# Patient Record
Sex: FEMALE | Race: OTHER | Employment: UNEMPLOYED | URBAN - METROPOLITAN AREA
[De-identification: names, ages, dates, MRNs, and addresses within clinical notes are randomized per-mention and may not be internally consistent; named-entity substitution may affect disease eponyms.]

---

## 2017-06-08 ENCOUNTER — OFFICE VISIT (OUTPATIENT)
Dept: INTERNAL MEDICINE CLINIC | Age: 62
End: 2017-06-08

## 2017-06-08 ENCOUNTER — HOSPITAL ENCOUNTER (OUTPATIENT)
Dept: LAB | Age: 62
Discharge: HOME | End: 2017-06-08
Payer: MEDICARE

## 2017-06-08 VITALS
SYSTOLIC BLOOD PRESSURE: 136 MMHG | WEIGHT: 188 LBS | TEMPERATURE: 98 F | BODY MASS INDEX: 32.1 KG/M2 | OXYGEN SATURATION: 95 % | HEIGHT: 64 IN | HEART RATE: 86 BPM | RESPIRATION RATE: 17 BRPM | DIASTOLIC BLOOD PRESSURE: 85 MMHG

## 2017-06-08 DIAGNOSIS — Z86.39 HX OF THYROID NODULE: ICD-10-CM

## 2017-06-08 DIAGNOSIS — R30.0 DYSURIA: ICD-10-CM

## 2017-06-08 DIAGNOSIS — R30.0 DYSURIA: Primary | ICD-10-CM

## 2017-06-08 DIAGNOSIS — B37.31 VULVOVAGINAL CANDIDIASIS: ICD-10-CM

## 2017-06-08 DIAGNOSIS — Z87.448 HX OF KIDNEY DISEASE: ICD-10-CM

## 2017-06-08 PROBLEM — E07.9 THYROID LESION: Status: ACTIVE | Noted: 2017-06-08

## 2017-06-08 PROBLEM — N28.9 KIDNEY LESION: Status: ACTIVE | Noted: 2017-06-08

## 2017-06-08 PROBLEM — R73.03 PRE-DIABETES: Status: ACTIVE | Noted: 2017-06-08

## 2017-06-08 LAB
BILIRUB UR QL STRIP: NEGATIVE
GLUCOSE UR-MCNC: NEGATIVE MG/DL
KETONES P FAST UR STRIP-MCNC: NEGATIVE MG/DL
PH UR STRIP: 5.5 [PH] (ref 4.6–8)
PROT UR QL STRIP: NEGATIVE MG/DL
SP GR UR STRIP: 1.01 (ref 1–1.03)
UA UROBILINOGEN AMB POC: NORMAL (ref 0.2–1)
URINALYSIS CLARITY POC: CLEAR
URINALYSIS COLOR POC: YELLOW
URINE BLOOD POC: NEGATIVE
URINE LEUKOCYTES POC: NORMAL
URINE NITRITES POC: NEGATIVE

## 2017-06-08 PROCEDURE — 87086 URINE CULTURE/COLONY COUNT: CPT | Performed by: NURSE PRACTITIONER

## 2017-06-08 RX ORDER — FLUCONAZOLE 150 MG/1
150 TABLET ORAL ONCE
Qty: 1 TAB | Refills: 0 | Status: SHIPPED | OUTPATIENT
Start: 2017-06-08 | End: 2017-06-08

## 2017-06-08 RX ORDER — NICOTINE 21 MG/24H
PATCH, EXTENDED RELEASE TRANSDERMAL
COMMUNITY
Start: 2017-04-20 | End: 2018-07-06 | Stop reason: ALTCHOICE

## 2017-06-08 NOTE — PROGRESS NOTES
Chief Complaint   Patient presents with    Urinary Frequency       Pt preferred language for health care discussion is english. Is someone accompanying this pt? no    Is the patient using any DME equipment during OV? no    Depression Screening completed. Yes    Learning Assessment completed. YEs    Abuse Screening completed. Yes    Health Maintenance reviewed and discussed per provider. Yes, first visit all HM discussed with the Provider    Advance Directive:  1. Do you have an advance directive in place? Patient Reply:no    2. If not, would you like material regarding how to put one in place? Patient Reply: No    Coordination of Care:  1. Have you been to the ER, urgent care clinic since your last visit? Hospitalized since your last visit? no    2. Have you seen or consulted any other health care providers outside of the [address] [place] [address] since your last visit? Include any pap smears or colon screening.  no

## 2017-06-08 NOTE — PROGRESS NOTES
HISTORY OF PRESENT ILLNESS  Bertha Paula is a 57 y.o. female. HPI Comments: Pt presents today with c/o creamy-white discharge onset 2 weeks ago, with mild odor and dysuria, frequency and urgency. Pt has tried increased fluids and cranberry juice with mild relief. Pt also reports vulvovaginal itching, recent onset of 2 days ago. Pt denies fever/chills, dark cloudy urine, hematuria, low abdominal pain, flank pain, N/V, exposure to STI, hx of prior yeast infection, UTIs. Pt also requesting to change PCP due to location. Last visit 6 months ago. She reports a history of thyroid and kidney lesion for which she gets a yearly MRI. She is due for thyroid MRI now and kidney MRI in August.                     Review of Systems   Constitutional: Negative for chills and fever. Respiratory: Negative. Negative for shortness of breath. Cardiovascular: Negative. Negative for chest pain. Gastrointestinal: Negative. Negative for abdominal pain, nausea and vomiting. Genitourinary: Positive for dysuria (mild), frequency and urgency. Negative for flank pain and hematuria. Physical Exam   Constitutional: She is oriented to person, place, and time. Vital signs are normal. She appears well-developed and well-nourished. No distress. Cardiovascular: Normal rate, regular rhythm and normal heart sounds. Pulmonary/Chest: Effort normal and breath sounds normal.   Abdominal: Soft. Bowel sounds are normal.   Neurological: She is alert and oriented to person, place, and time.      Visit Vitals    /85    Pulse 86    Temp 98 °F (36.7 °C) (Oral)    Resp 17    Ht 5' 4\" (1.626 m)    Wt 188 lb (85.3 kg)    SpO2 95%    BMI 32.27 kg/m2      Recent Results (from the past 12 hour(s))   AMB POC URINALYSIS DIP STICK AUTO W/O MICRO    Collection Time: 06/08/17  2:42 PM   Result Value Ref Range    Color (UA POC) Yellow     Clarity (UA POC) Clear     Glucose (UA POC) Negative Negative    Bilirubin (UA POC) Negative Negative    Ketones (UA POC) Negative Negative    Specific gravity (UA POC) 1.010 1.001 - 1.035    Blood (UA POC) Negative Negative    pH (UA POC) 5.5 4.6 - 8.0    Protein (UA POC) Negative Negative mg/dL    Urobilinogen (UA POC) 0.2 mg/dL 0.2 - 1    Nitrites (UA POC) Negative Negative    Leukocyte esterase (UA POC) Trace Negative      ASSESSMENT and PLAN    ICD-10-CM ICD-9-CM    1. Dysuria R30.0 788.1 AMB POC URINALYSIS DIP STICK AUTO W/O MICRO      CULTURE, URINE   2. Vulvovaginal candidiasis B37.3 112.1 fluconazole (DIFLUCAN) 150 mg tablet   3. Hx of thyroid nodule Z86.39 V12.29 REFERRAL TO ENDOCRINOLOGY   4. Hx of kidney disease Z87.448 V13.09 REFERRAL TO NEPHROLOGY     Reviewed medications and plan with patient. Send out urine culture. Schedule for f/u physical to establish care in 1 month.

## 2017-06-08 NOTE — PATIENT INSTRUCTIONS

## 2017-06-08 NOTE — MR AVS SNAPSHOT
Visit Information Date & Time Provider Department Dept. Phone Encounter #  
 6/8/2017  2:30 PM Arturo Broderick NP [address] [phone] [id number] Follow-up Instructions Return in about 1 month (around 7/8/2017) for Routine Physical Exam, F/up. Upcoming Health Maintenance Date Due Hepatitis C Screening 1955 Pneumococcal 19-64 Medium Risk (1 of 1 - PPSV23) 12/28/1973 DTaP/Tdap/Td series (1 - Tdap) 12/28/1975 PAP AKA CERVICAL CYTOLOGY 12/28/1975 BREAST CANCER SCRN MAMMOGRAM 12/28/2004 FOBT Q 1 YEAR AGE 50-75 12/28/2004 ZOSTER VACCINE AGE 60> 12/28/2014 INFLUENZA AGE 9 TO ADULT 8/1/2017 Allergies as of 6/8/2017  Review Complete On: 6/8/2017 By: Arturo Broderick NP Severity Noted Reaction Type Reactions Ambien [Zolpidem]  09/13/2010    Anxiety Current Immunizations  Never Reviewed No immunizations on file. Not reviewed this visit You Were Diagnosed With   
  
 Codes Comments Dysuria    -  Primary ICD-10-CM: R30.0 ICD-9-CM: 239. 1 Vulvovaginal candidiasis     ICD-10-CM: B37.3 ICD-9-CM: 112.1 Hx of thyroid nodule     ICD-10-CM: Z86.39 
ICD-9-CM: V12.29 Hx of kidney disease     ICD-10-CM: Z87.448 ICD-9-CM: V13.09 Vitals BP Pulse Temp Resp Height(growth percentile) Weight(growth percentile) 136/85 86 98 °F (36.7 °C) (Oral) 17 5' 4\" (1.626 m) 188 lb (85.3 kg) SpO2 BMI OB Status Smoking Status 95% 32.27 kg/m2 Postmenopausal Current Every Day Smoker BMI and BSA Data Body Mass Index Body Surface Area  
 32.27 kg/m 2 1.96 m 2 Your Updated Medication List  
  
   
This list is accurate as of: 6/8/17  2:58 PM.  Always use your most recent med list.  
  
  
  
  
 fluconazole 150 mg tablet Commonly known as:  DIFLUCAN Take 1 Tab by mouth once for 1 dose. Indications: VULVOVAGINAL CANDIDIASIS  
  
 IRON (FERROUS SULFATE) PO Take  by mouth. MULTIVITAMIN PO Take  by mouth. NICODERM CQ 21 mg/24 hr  
Generic drug:  nicotine NICOrelief 4 mg gum Generic drug:  nicotine SYNTHROID PO Take  by mouth. Prescriptions Printed Refills  
 fluconazole (DIFLUCAN) 150 mg tablet 0 Sig: Take 1 Tab by mouth once for 1 dose. Indications: VULVOVAGINAL CANDIDIASIS Class: Print Route: Oral  
  
We Performed the Following AMB POC URINALYSIS DIP STICK AUTO W/O MICRO [71128 CPT(R)] REFERRAL TO ENDOCRINOLOGY [NXQ45 Custom] Comments:  
 Please evaluate patient for f/u thyroid lesion, yearly MRI is due. In [address] Custom] Comments:  
 Please evaluate patient for kidney lesion, yearly MRI due in August.  
  
Follow-up Instructions Return in about 1 month (around 7/8/2017) for Routine Physical Exam, F/up. Referral Information Referral ID Referred By Referred To  
  
 [id number] Mary Su Not Available Visits Status Start Date End Date 1 New Request 6/8/17 6/8/18 If your referral has a status of pending review or denied, additional information will be sent to support the outcome of this decision. Referral ID Referred By Referred To  
 [id number] Mary Su Not Available Visits Status Start Date End Date 1 New Request 6/8/17 6/8/18 If your referral has a status of pending review or denied, additional information will be sent to support the outcome of this decision. Patient Instructions Vaginal Yeast Infection: Care Instructions Your Care Instructions A vaginal yeast infection is caused by too many yeast cells in the vagina. This is common in women of all ages. Itching, vaginal discharge and irritation, and other symptoms can bother you. But yeast infections don't often cause other health problems. Some medicines can increase your risk of getting a yeast infection.  These include antibiotics, birth control pills, hormones, and steroids. You may also be more likely to get a yeast infection if you are pregnant, have diabetes, douche, or wear tight clothes. With treatment, most yeast infections get better in 2 to 3 days. Follow-up care is a key part of your treatment and safety. Be sure to make and go to all appointments, and call your doctor if you are having problems. It's also a good idea to know your test results and keep a list of the medicines you take. How can you care for yourself at home? · Take your medicines exactly as prescribed. Call your doctor if you think you are having a problem with your medicine. · Ask your doctor about over-the-counter (OTC) medicines for yeast infections. They may cost less than prescription medicines. If you use an OTC treatment, read and follow all instructions on the label. · Do not use tampons while using a vaginal cream or suppository. The tampons can absorb the medicine. Use pads instead. · Wear loose cotton clothing. Do not wear nylon or other fabric that holds body heat and moisture close to the skin. · Try sleeping without underwear. · Do not scratch. Relieve itching with a cold pack or a cool bath. · Do not wash your vaginal area more than once a day. Use plain water or a mild, unscented soap. Air-dry the vaginal area. · Change out of wet swimsuits after swimming. · Do not have sex until you have finished your treatment. · Do not douche. When should you call for help? Call your doctor now or seek immediate medical care if: 
· You have unexpected vaginal bleeding. · You have new or increased pain in your vagina or pelvis. Watch closely for changes in your health, and be sure to contact your doctor if: 
· You have a fever. · You are not getting better after 2 days. · Your symptoms come back after you finish your medicines. Where can you learn more? Go to http://frieda-prudence.info/. Enter V602 in the search box to learn more about \"Vaginal Yeast Infection: Care Instructions. \" Current as of: October 13, 2016 Content Version: 11.2 © 7942-1842 [employer]ov[employer]. Care instructions adapted under license by [employer] (which disclaims liability or warranty for this information). If you have questions about a medical condition or this instruction, always ask your healthcare professional. [address] any warranty or liability for your use of this information. Introducing [facility] & HEALTH SERVICES! Peter Schuster introduces Max-Viz patient portal. Now you can access parts of your medical record, email your doctor's office, and request medication refills online. 1. In your internet browser, go to [email or web address] 2. Click on the First Time User? Click Here link in the Sign In box. You will see the New Member Sign Up page. 3. Enter your Max-Viz Access Code exactly as it appears below. You will not need to use this code after youve completed the sign-up process. If you do not sign up before the expiration date, you must request a new code. · Max-Viz Access Code: [email or web address] Expires: 9/6/2017  2:58 PM 
 
4. Enter the last four digits of your Social Security Number (xxxx) and Date of Birth (mm/dd/yyyy) as indicated and click Submit. You will be taken to the next sign-up page. 5. Create a Max-Viz ID. This will be your Max-Viz login ID and cannot be changed, so think of one that is secure and easy to remember. 6. Create a Max-Viz password. You can change your password at any time. 7. Enter your Password Reset Question and Answer. This can be used at a later time if you forget your password. 8. Enter your e-mail address. You will receive e-mail notification when new information is available in [address] 9. Click Sign Up. You can now view and download portions of your medical record. 10. Click the Download Summary menu link to download a portable copy of your medical information. If you have questions, please visit the Frequently Asked Questions section of the CogniK website. Remember, CogniK is NOT to be used for urgent needs. For medical emergencies, dial 911. Now available from your iPhone and Android! Please provide this summary of care documentation to your next provider. Your primary care clinician is listed as Clifton Valladares. If you have any questions after today's visit, please call [phone].

## 2017-06-10 LAB
BACTERIA SPEC CULT: NORMAL
SERVICE CMNT-IMP: NORMAL

## 2018-04-26 ENCOUNTER — OFFICE VISIT (OUTPATIENT)
Dept: INTERNAL MEDICINE CLINIC | Age: 63
End: 2018-04-26

## 2018-04-26 VITALS
HEIGHT: 64 IN | RESPIRATION RATE: 14 BRPM | DIASTOLIC BLOOD PRESSURE: 86 MMHG | OXYGEN SATURATION: 100 % | HEART RATE: 78 BPM | SYSTOLIC BLOOD PRESSURE: 152 MMHG | BODY MASS INDEX: 33.29 KG/M2 | TEMPERATURE: 97.8 F | WEIGHT: 195 LBS

## 2018-04-26 DIAGNOSIS — M25.471 SWELLING OF BOTH ANKLES: Primary | ICD-10-CM

## 2018-04-26 DIAGNOSIS — M25.472 SWELLING OF BOTH ANKLES: Primary | ICD-10-CM

## 2018-04-26 NOTE — PROGRESS NOTES
HISTORY OF PRESENT ILLNESS  Jomar Najera is a 60 y.o. female. Patient presents with bilateral ankle swelling x 2 years, believes the her ankle are more swollen and associated with numbness and tingling. States she was told by [facility] doctor\" that it was lymphedema, patient is being followed at the [place] [address] for her TSH, kidney disease. 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 Patient denies any HA,blurry vision, unilateral weakness, slurred speech,facial drooling,drooping, NV,numbness,tingling,dizziness,palpitations, malaise,faigue,confusion,SOB,CP. Ankle swelling    The history is provided by the patient. This is a chronic problem. The pain is present in the left lower leg and right lower leg. The pain is at a severity of 0/10. Associated symptoms include numbness and tingling. Pertinent negatives include full range of motion, no itching and no back pain. She has tried nothing for the symptoms. Review of Systems   HENT: Negative. Cardiovascular: Positive for leg swelling. Musculoskeletal: Negative for back pain. Bilateral lateral malleolus swelling associated with tingling, good dorsalis pedi pulses, good cap refill. Skin: Negative. Negative for itching. Neurological: Positive for tingling and numbness. Psychiatric/Behavioral: Negative. Physical Exam   Constitutional: She is oriented to person, place, and time. She appears well-developed and well-nourished. /86 (BP 1 Location: Right arm, BP Patient Position: Sitting)  Pulse 78  Temp 97.8 °F (36.6 °C) (Oral)   Resp 14  Ht 5' 4\" (1.626 m)  Wt 195 lb (88.5 kg)  LMP Comment: Hysterectomy  SpO2 100%  BMI 33.47 kg/m2     HENT:   Head: Normocephalic and atraumatic. Eyes: Conjunctivae and EOM are normal. Pupils are equal, round, and reactive to light. Neck: Normal range of motion. Cardiovascular: Normal rate. Pulmonary/Chest: Effort normal and breath sounds normal.   Musculoskeletal: Normal range of motion. Right ankle: She exhibits swelling. Left ankle: She exhibits swelling. Neurological: She is alert and oriented to person, place, and time. Skin: Skin is warm and dry. Psychiatric: She has a normal mood and affect. Her behavior is normal. Judgment and thought content normal.   Vitals reviewed. ASSESSMENT and PLAN    ICD-10-CM ICD-9-CM    1. Swelling of both ankles M25.471 729.81 AMB SUPPLY ORDER    M25.472       Encounter Diagnoses   Name Primary?  Swelling of both ankles Yes     Orders Placed This Encounter    AMB SUPPLY ORDER     Orders Placed This Encounter    AMB SUPPLY ORDER     Provide compression stockings. Orders Placed This Encounter    AMB SUPPLY ORDER     Diagnoses and all orders for this visit:    1. Swelling of both ankles  -     AMB SUPPLY ORDER      Follow-up Disposition:  Return if symptoms worsen or fail to improve. current treatment plan is effective, no change in therapy  the following changes in treatment are made: Patient declines any lab work up, she is being followed at the [place] where she just had labs done, needs an excuse note from work today, decrease long standing hours, start leg elevation,decrease sodium intake, use compression stockings,f/u with PCP.

## 2018-04-26 NOTE — MR AVS SNAPSHOT
[address] [facility] 
 
 
 [address] [facility] [address] [address] 
[phone] Patient: Citlaly Rodriguez MRN: [id number] :1955 Visit Information Date & Time Provider Department Dept. Phone Encounter #  
 2018  8:00 AM William Giron NP [facility] [phone] [id number] Follow-up Instructions Return if symptoms worsen or fail to improve. Upcoming Health Maintenance Date Due Hepatitis C Screening 1955 Pneumococcal 19-64 Medium Risk (1 of 1 - PPSV23) 1973 DTaP/Tdap/Td series (1 - Tdap) 1975 PAP AKA CERVICAL CYTOLOGY 1975 BREAST CANCER SCRN MAMMOGRAM 2004 FOBT Q 1 YEAR AGE 50-75 2004 ZOSTER VACCINE AGE 60> 10/28/2014 Influenza Age 5 to Adult 2017 MEDICARE YEARLY EXAM 3/14/2018 Allergies as of 2018  Review Complete On: 2017 By: Henry Shelley NP Severity Noted Reaction Type Reactions Ambien [Zolpidem]  2010    Anxiety Current Immunizations  Never Reviewed No immunizations on file. Not reviewed this visit You Were Diagnosed With   
  
 Codes Comments Swelling of both ankles    -  Primary ICD-10-CM: M25.471, M25.472 ICD-9-CM: 729.81 Vitals BP Pulse Temp Resp Height(growth percentile) Weight(growth percentile) 152/86 (BP 1 Location: Right arm, BP Patient Position: Sitting) 78 97.8 °F (36.6 °C) (Oral) 14 5' 4\" (1.626 m) 195 lb (88.5 kg) SpO2 BMI OB Status Smoking Status 100% 33.47 kg/m2 Postmenopausal Former Smoker Vitals History BMI and BSA Data Body Mass Index Body Surface Area  
 33.47 kg/m 2 2 m 2 Your Updated Medication List  
  
   
This list is accurate as of 18  8:53 AM.  Always use your most recent med list.  
  
  
  
  
 IRON (FERROUS SULFATE) PO Take  by mouth. MULTIVITAMIN PO Take  by mouth.   
  
 NICODERM CQ 21 mg/24 hr  
 Generic drug:  nicotine NICOrelief 4 mg gum Generic drug:  nicotine SYNTHROID PO Take  by mouth. We Performed the Following AMB SUPPLY ORDER [[id number] Custom] Comments:  
 Provide compression stockings. Follow-up Instructions Return if symptoms worsen or fail to improve. Patient Instructions Leg and Ankle Edema: Care Instructions Your Care Instructions Swelling in the legs, ankles, and feet is called edema. It is common after you sit or stand for a while. Long plane flights or car rides often cause swelling in the legs and feet. You may also have swelling if you have to stand for long periods of time at your job. Problems with the veins in the legs (varicose veins) and changes in hormones can also cause swelling. Sometimes the swelling in the ankles and feet is caused by a more serious problem, such as heart failure, infection, blood clots, or liver or kidney disease. Follow-up care is a key part of your treatment and safety. Be sure to make and go to all appointments, and call your doctor if you are having problems. It's also a good idea to know your test results and keep a list of the medicines you take. How can you care for yourself at home? · If your doctor gave you medicine, take it as prescribed. Call your doctor if you think you are having a problem with your medicine. · Whenever you are resting, raise your legs up. Try to keep the swollen area higher than the level of your heart. · Take breaks from standing or sitting in one position. ¨ Walk around to increase the blood flow in your lower legs. ¨ Move your feet and ankles often while you stand, or tighten and relax your leg muscles. · Wear support stockings. Put them on in the morning, before swelling gets worse. · Eat a balanced diet. Lose weight if you need to. · Limit the amount of salt (sodium) in your diet. Salt holds fluid in the body and may increase swelling. When should you call for help? Call 911 anytime you think you may need emergency care. For example, call if: 
? · You have symptoms of a blood clot in your lung (called a pulmonary embolism). These may include: 
¨ Sudden chest pain. ¨ Trouble breathing. ¨ Coughing up blood. ?Call your doctor now or seek immediate medical care if: 
? · You have signs of a blood clot, such as: 
¨ Pain in your calf, back of the knee, thigh, or groin. ¨ Redness and swelling in your leg or groin. ? · You have symptoms of infection, such as: 
¨ Increased pain, swelling, warmth, or redness. ¨ Red streaks or pus. ¨ A fever. ? Watch closely for changes in your health, and be sure to contact your doctor if: 
? · Your swelling is getting worse. ? · You have new or worsening pain in your legs. ? · You do not get better as expected. Where can you learn more? Go to http://frieda-prudence.info/. Enter D101 in the search box to learn more about \"Leg and Ankle Edema: Care Instructions. \" Current as of: March 20, 2017 Content Version: 11.4 © 6814-1080 [employer]. Care instructions adapted under license by [employer] (which disclaims liability or warranty for this information). If you have questions about a medical condition or this instruction, always ask your healthcare professional. [place] [address] any warranty or liability for your use of this information. Introducing [facility] & HEALTH SERVICES! [employer] introduces [employer] patient portal. Now you can access parts of your medical record, email your doctor's office, and request medication refills online. 1. In your internet browser, go to [email or web address]://Pretty in my Pocket (PRIMP). Survature/Pretty in my Pocket (PRIMP) 2. Click on the First Time User? Click Here link in the Sign In box. You will see the New Member Sign Up page. 3. Enter your [employer] Access Code exactly as it appears below.  You will not need to use this code after youve completed the sign-up process. If you do not sign up before the expiration date, you must request a new code. · LeisureLink Access Code: [email or web address] Expires: 7/25/2018  8:51 AM 
 
4. Enter the last four digits of your Social Security Number (xxxx) and Date of Birth (mm/dd/yyyy) as indicated and click Submit. You will be taken to the next sign-up page. 5. Create a LeisureLink ID. This will be your LeisureLink login ID and cannot be changed, so think of one that is secure and easy to remember. 6. Create a LeisureLink password. You can change your password at any time. 7. Enter your Password Reset Question and Answer. This can be used at a later time if you forget your password. 8. Enter your e-mail address. You will receive e-mail notification when new information is available in [address] 9. Click Sign Up. You can now view and download portions of your medical record. 10. Click the Download Summary menu link to download a portable copy of your medical information. If you have questions, please visit the Frequently Asked Questions section of the LeisureLink website. Remember, LeisureLink is NOT to be used for urgent needs. For medical emergencies, dial 911. Now available from your iPhone and Android! Please provide this summary of care documentation to your next provider. Your primary care clinician is listed as Letty Fairchild. If you have any questions after today's visit, please call [phone].

## 2018-04-26 NOTE — LETTER
NOTIFICATION RETURN TO WORK / SCHOOL 
 
4/26/2018 8:51 AM 
 
Ms. Raymundo Moreno [address] [facility] [id number] To Whom It May Concern: 
 
Raymundo Moreno is currently under the care of Giancarlo Guevara. She will return to work/school on: 04/27/2018 If there are questions or concerns please have the patient contact our office. Sincerely, Brook Rowland, NP

## 2018-04-26 NOTE — PROGRESS NOTES
Here today for progressively worsening bilateral ankle swelling x 2 years, RT > LT. Denies any injury, chest pain, SOB. No pain in knees or hips just ankles. States numbness and tingling with weight bearing and movement. Her PCP and pharmacy is at [facility], [employer].

## 2018-04-26 NOTE — PROGRESS NOTES
Patient presents with bilateral ankle swelling x 2 years, believes the her ankle are more swollen and associated with numbness and tingling. States she was told by Azam garcia\" that it was lymphedema, patient is being followed at the [place] [address] for her TSH, kidney disease. 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 Patient denies any HA,blurry vision, unilateral weakness, slurred speech,facial drooling,drooping, NV,numbness,tingling,dizziness,palpitations, malaise,faigue,confusion,SOB,CP.

## 2018-04-26 NOTE — PATIENT INSTRUCTIONS
Leg and Ankle Edema: Care Instructions  Your Care Instructions  Swelling in the legs, ankles, and feet is called edema. It is common after you sit or stand for a while. Long plane flights or car rides often cause swelling in the legs and feet. You may also have swelling if you have to stand for long periods of time at your job. Problems with the veins in the legs (varicose veins) and changes in hormones can also cause swelling. Sometimes the swelling in the ankles and feet is caused by a more serious problem, such as heart failure, infection, blood clots, or liver or kidney disease. Follow-up care is a key part of your treatment and safety. Be sure to make and go to all appointments, and call your doctor if you are having problems. It's also a good idea to know your test results and keep a list of the medicines you take. How can you care for yourself at home? · If your doctor gave you medicine, take it as prescribed. Call your doctor if you think you are having a problem with your medicine. · Whenever you are resting, raise your legs up. Try to keep the swollen area higher than the level of your heart. · Take breaks from standing or sitting in one position. ¨ Walk around to increase the blood flow in your lower legs. ¨ Move your feet and ankles often while you stand, or tighten and relax your leg muscles. · Wear support stockings. Put them on in the morning, before swelling gets worse. · Eat a balanced diet. Lose weight if you need to. · Limit the amount of salt (sodium) in your diet. Salt holds fluid in the body and may increase swelling. When should you call for help? Call 911 anytime you think you may need emergency care. For example, call if:  ? · You have symptoms of a blood clot in your lung (called a pulmonary embolism). These may include:  ¨ Sudden chest pain. ¨ Trouble breathing. ¨ Coughing up blood.    ?Call your doctor now or seek immediate medical care if:  ? · You have signs of a blood clot, such as:  ¨ Pain in your calf, back of the knee, thigh, or groin. ¨ Redness and swelling in your leg or groin. ? · You have symptoms of infection, such as:  ¨ Increased pain, swelling, warmth, or redness. ¨ Red streaks or pus. ¨ A fever. ? Watch closely for changes in your health, and be sure to contact your doctor if:  ? · Your swelling is getting worse. ? · You have new or worsening pain in your legs. ? · You do not get better as expected. Where can you learn more? Go to http://frieda-prudence.info/. Enter I072 in the search box to learn more about \"Leg and Ankle Edema: Care Instructions. \"  Current as of: March 20, 2017  Content Version: 11.4  © 5993-3287 [employer]. Care instructions adapted under license by [employer] (which disclaims liability or warranty for this information). If you have questions about a medical condition or this instruction, always ask your healthcare professional. [place] [address] any warranty or liability for your use of this information.

## 2018-07-06 ENCOUNTER — HOSPITAL ENCOUNTER (OUTPATIENT)
Dept: LAB | Age: 63
Discharge: HOME | End: 2018-07-06
Payer: MEDICARE

## 2018-07-06 ENCOUNTER — OFFICE VISIT (OUTPATIENT)
Dept: INTERNAL MEDICINE CLINIC | Age: 63
End: 2018-07-06

## 2018-07-06 VITALS
SYSTOLIC BLOOD PRESSURE: 137 MMHG | DIASTOLIC BLOOD PRESSURE: 84 MMHG | RESPIRATION RATE: 16 BRPM | TEMPERATURE: 97.5 F | BODY MASS INDEX: 32.1 KG/M2 | HEIGHT: 64 IN | HEART RATE: 78 BPM | OXYGEN SATURATION: 95 % | WEIGHT: 188 LBS

## 2018-07-06 DIAGNOSIS — N28.9 KIDNEY LESION: ICD-10-CM

## 2018-07-06 DIAGNOSIS — F17.210 CIGARETTE NICOTINE DEPENDENCE WITHOUT COMPLICATION: ICD-10-CM

## 2018-07-06 DIAGNOSIS — Z00.00 ANNUAL PHYSICAL EXAM: Primary | ICD-10-CM

## 2018-07-06 DIAGNOSIS — Z86.39 HISTORY OF THYROID NODULE: ICD-10-CM

## 2018-07-06 DIAGNOSIS — Z00.00 ANNUAL PHYSICAL EXAM: ICD-10-CM

## 2018-07-06 DIAGNOSIS — H53.9 VISION CHANGES: ICD-10-CM

## 2018-07-06 LAB
BASOPHILS # BLD: 0.1 K/UL (ref 0–0.06)
BASOPHILS NFR BLD: 1 % (ref 0–2)
DIFFERENTIAL METHOD BLD: ABNORMAL
EOSINOPHIL # BLD: 0.1 K/UL (ref 0–0.4)
EOSINOPHIL NFR BLD: 2 % (ref 0–5)
ERYTHROCYTE [DISTWIDTH] IN BLOOD BY AUTOMATED COUNT: 14.1 % (ref 11.6–14.5)
EST. AVERAGE GLUCOSE BLD GHB EST-MCNC: 111 MG/DL
HBA1C MFR BLD: 5.5 % (ref 4.2–5.6)
HCT VFR BLD AUTO: 43 % (ref 35–45)
HGB BLD-MCNC: 13.7 G/DL (ref 12–16)
LYMPHOCYTES # BLD: 1.8 K/UL (ref 0.9–3.6)
LYMPHOCYTES NFR BLD: 37 % (ref 21–52)
MCH RBC QN AUTO: 29.5 PG (ref 24–34)
MCHC RBC AUTO-ENTMCNC: 31.9 G/DL (ref 31–37)
MCV RBC AUTO: 92.7 FL (ref 74–97)
MONOCYTES # BLD: 0.4 K/UL (ref 0.05–1.2)
MONOCYTES NFR BLD: 8 % (ref 3–10)
NEUTS SEG # BLD: 2.6 K/UL (ref 1.8–8)
NEUTS SEG NFR BLD: 52 % (ref 40–73)
PLATELET # BLD AUTO: 259 K/UL (ref 135–420)
PMV BLD AUTO: 10.3 FL (ref 9.2–11.8)
RBC # BLD AUTO: 4.64 M/UL (ref 4.2–5.3)
WBC # BLD AUTO: 4.9 K/UL (ref 4.6–13.2)

## 2018-07-06 PROCEDURE — 85025 COMPLETE CBC W/AUTO DIFF WBC: CPT | Performed by: NURSE PRACTITIONER

## 2018-07-06 PROCEDURE — 80061 LIPID PANEL: CPT | Performed by: NURSE PRACTITIONER

## 2018-07-06 PROCEDURE — 36415 COLL VENOUS BLD VENIPUNCTURE: CPT | Performed by: NURSE PRACTITIONER

## 2018-07-06 PROCEDURE — 83036 HEMOGLOBIN GLYCOSYLATED A1C: CPT | Performed by: NURSE PRACTITIONER

## 2018-07-06 PROCEDURE — 84443 ASSAY THYROID STIM HORMONE: CPT | Performed by: NURSE PRACTITIONER

## 2018-07-06 PROCEDURE — 80053 COMPREHEN METABOLIC PANEL: CPT | Performed by: NURSE PRACTITIONER

## 2018-07-06 PROCEDURE — 84439 ASSAY OF FREE THYROXINE: CPT | Performed by: NURSE PRACTITIONER

## 2018-07-06 PROCEDURE — 84480 ASSAY TRIIODOTHYRONINE (T3): CPT | Performed by: NURSE PRACTITIONER

## 2018-07-06 RX ORDER — LEVOTHYROXINE SODIUM 25 UG/1
25 TABLET ORAL
Qty: 90 TAB | Refills: 0 | Status: SHIPPED | OUTPATIENT
Start: 2018-07-06 | End: 2018-10-03 | Stop reason: SDUPTHER

## 2018-07-06 RX ORDER — ACETAMINOPHEN 500 MG
2 TABLET ORAL AS NEEDED
Qty: 190 EACH | Refills: 0 | Status: SHIPPED | OUTPATIENT
Start: 2018-07-06 | End: 2018-08-05

## 2018-07-06 NOTE — PROGRESS NOTES
HISTORY OF PRESENT ILLNESS  Debbie Love is a 60 y.o. female. HPI Comments: Patient was here to establish care. She has a history of thyroid lesions, kidney lesions. Establish Care   The history is provided by the patient. Pertinent negatives include no chest pain, no abdominal pain, no headaches and no shortness of breath. Thyroid Problem   Pertinent negatives include no chest pain, no abdominal pain, no headaches and no shortness of breath. Review of Systems   Constitutional: Negative for chills, diaphoresis, fever, malaise/fatigue and weight loss. HENT: Negative for congestion, ear discharge, ear pain, hearing loss, nosebleeds, sore throat and tinnitus. Eyes: Positive for blurred vision. Negative for double vision, photophobia, pain, discharge and redness. Respiratory: Negative for cough, hemoptysis, sputum production, shortness of breath, wheezing and stridor. Cardiovascular: Negative for chest pain, palpitations, orthopnea, claudication, leg swelling and PND. Gastrointestinal: Negative for abdominal pain, blood in stool, constipation, diarrhea, heartburn, melena, nausea and vomiting. Genitourinary: Negative for dysuria, flank pain, frequency, hematuria and urgency. Musculoskeletal: Negative for back pain, falls, joint pain, myalgias and neck pain. Skin: Negative for itching and rash. Neurological: Negative for dizziness, tingling, tremors, sensory change, speech change, focal weakness, seizures, loss of consciousness, weakness and headaches. Endo/Heme/Allergies: Negative for environmental allergies. Does not bruise/bleed easily. Psychiatric/Behavioral: Negative for depression, hallucinations, memory loss, substance abuse and suicidal ideas. The patient is not nervous/anxious and does not have insomnia. Physical Exam   Constitutional: She is oriented to person, place, and time. She appears well-developed and well-nourished. No distress.    HENT:   Head: Normocephalic and atraumatic. Right Ear: External ear normal.   Left Ear: External ear normal.   Nose: Nose normal.   Mouth/Throat: Oropharynx is clear and moist. No oropharyngeal exudate. Eyes: Conjunctivae and EOM are normal. Pupils are equal, round, and reactive to light. Right eye exhibits no discharge. Left eye exhibits no discharge. Neck: Normal range of motion. Neck supple. No tracheal deviation present. No thyromegaly present. Cardiovascular: Normal rate, regular rhythm and normal heart sounds. Pulmonary/Chest: Effort normal and breath sounds normal. No respiratory distress. She has no wheezes. Abdominal: Soft. Bowel sounds are normal. She exhibits no distension. There is no tenderness. Musculoskeletal: Normal range of motion. She exhibits no edema. Lymphadenopathy:     She has no cervical adenopathy. Neurological: She is alert and oriented to person, place, and time. No cranial nerve deficit. Skin: Skin is warm and dry. She is not diaphoretic. No erythema. Psychiatric: She has a normal mood and affect. ASSESSMENT and PLAN    ICD-10-CM ICD-9-CM    1. Annual physical exam Z00.00 V70.0 CBC WITH AUTOMATED DIFF      TSH 3RD GENERATION      T3 TOTAL      T4, FREE      LIPID PANEL      HEMOGLOBIN A1C WITH EAG      METABOLIC PANEL, COMPREHENSIVE      REFERRAL TO GASTROENTEROLOGY   2. History of thyroid nodule Z86.39 V12.29 levothyroxine (SYNTHROID) 25 mcg tablet   3. Kidney lesion N28.9 593.9 REFERRAL TO NEPHROLOGY      REFERRAL TO ENDOCRINOLOGY   4. Vision changes H53.9 368.9 REFERRAL TO OPHTHALMOLOGY   5. Cigarette nicotine dependence without complication Z24.509 451.6 nicotine (NICORETTE) 2 mg gum   Head to toe assessment performed, all basic labs will be obtained. Patient teaching done regarding healthy eating and exercise. Patient advised to keep in contact with PCP for regular checkups. Further plan of care will be established according to lab results.

## 2018-07-06 NOTE — PATIENT INSTRUCTIONS
Well Visit, Women 48 to 72: Care Instructions  Your Care Instructions    Physical exams can help you stay healthy. Your doctor has checked your overall health and may have suggested ways to take good care of yourself. He or she also may have recommended tests. At home, you can help prevent illness with healthy eating, regular exercise, and other steps. Follow-up care is a key part of your treatment and safety. Be sure to make and go to all appointments, and call your doctor if you are having problems. It's also a good idea to know your test results and keep a list of the medicines you take. How can you care for yourself at home? · Reach and stay at a healthy weight. This will lower your risk for many problems, such as obesity, diabetes, heart disease, and high blood pressure. · Get at least 30 minutes of exercise on most days of the week. Walking is a good choice. You also may want to do other activities, such as running, swimming, cycling, or playing tennis or team sports. · Do not smoke. Smoking can make health problems worse. If you need help quitting, talk to your doctor about stop-smoking programs and medicines. These can increase your chances of quitting for good. · Protect your skin from too much sun. When you're outdoors from 10 a.m. to 4 p.m., stay in the shade or cover up with clothing and a hat with a wide brim. Wear sunglasses that block UV rays. Even when it's cloudy, put broad-spectrum sunscreen (SPF 30 or higher) on any exposed skin. · See a dentist one or two times a year for checkups and to have your teeth cleaned. · Wear a seat belt in the car. · Limit alcohol to 1 drink a day. Too much alcohol can cause health problems. Follow your doctor's advice about when to have certain tests. These tests can spot problems early. · Cholesterol.  Your doctor will tell you how often to have this done based on your age, family history, or other things that can increase your risk for heart attack and stroke. · Blood pressure. Have your blood pressure checked during a routine doctor visit. Your doctor will tell you how often to check your blood pressure based on your age, your blood pressure results, and other factors. · Mammogram. Ask your doctor how often you should have a mammogram, which is an X-ray of your breasts. A mammogram can spot breast cancer before it can be felt and when it is easiest to treat. · Pap test and pelvic exam. Ask your doctor how often you should have a Pap test. You may not need to have a Pap test as often as you used to. · Vision. Have your eyes checked every year or two or as often as your doctor suggests. Some experts recommend that you have yearly exams for glaucoma and other age-related eye problems starting at age 48. · Hearing. Tell your doctor if you notice any change in your hearing. You can have tests to find out how well you hear. · Diabetes. Ask your doctor whether you should have tests for diabetes. · Colon cancer. You should begin tests for colon cancer at age 48. You may have one of several tests. Your doctor will tell you how often to have tests based on your age and risk. Risks include whether you already had a precancerous polyp removed from your colon or whether your parents, sisters and brothers, or children have had colon cancer. · Thyroid disease. Talk to your doctor about whether to have your thyroid checked as part of a regular physical exam. Women have an increased chance of a thyroid problem. · Osteoporosis. You should begin tests for bone density at age 72. If you are younger than 72, ask your doctor whether you have factors that may increase your risk for this disease. You may want to have this test before age 72. · Heart attack and stroke risk. At least every 4 to 6 years, you should have your risk for heart attack and stroke assessed.  Your doctor uses factors such as your age, blood pressure, cholesterol, and whether you smoke or have diabetes to show what your risk for a heart attack or stroke is over the next 10 years. When should you call for help? Watch closely for changes in your health, and be sure to contact your doctor if you have any problems or symptoms that concern you. Where can you learn more? Go to http://frieda-prudence.info/. Enter X228 in the search box to learn more about \"Well Visit, Women 50 to 72: Care Instructions. \"  Current as of: May 12, 2017  Content Version: 11.4  © 0954-2564 [employer]. Care instructions adapted under license by [employer] (which disclaims liability or warranty for this information). If you have questions about a medical condition or this instruction, always ask your healthcare professional. [address] any warranty or liability for your use of this information.

## 2018-07-06 NOTE — MR AVS SNAPSHOT
Jennifer Godoy 
 
 
 [address] [facility] [address] 
[phone] Patient: Erendira Tavares MRN: [id number] :1955 Visit Information Date & Time Provider Department Dept. Phone Encounter #  
 2018 10:00 AM Emiliano Londono NP [employer] [phone] [id number] Upcoming Health Maintenance Date Due Hepatitis C Screening 1955 DTaP/Tdap/Td series (1 - Tdap) 1975 PAP AKA CERVICAL CYTOLOGY 1975 BREAST CANCER SCRN MAMMOGRAM 2004 FOBT Q 1 YEAR AGE 50-75 2004 ZOSTER VACCINE AGE 60> 10/28/2014 MEDICARE YEARLY EXAM 3/14/2018 Influenza Age 5 to Adult 2018 Allergies as of 2018  Review Complete On: 2018 By: Emiliano Londono NP Severity Noted Reaction Type Reactions Ambien [Zolpidem]  2010    Anxiety Current Immunizations  Never Reviewed No immunizations on file. Not reviewed this visit You Were Diagnosed With   
  
 Codes Comments Annual physical exam    -  Primary ICD-10-CM: Z00.00 ICD-9-CM: V70.0 History of thyroid nodule     ICD-10-CM: Z86.39 
ICD-9-CM: V12.29 Kidney lesion     ICD-10-CM: N28.9 ICD-9-CM: 593.9 Vision changes     ICD-10-CM: H53.9 ICD-9-CM: 368.9 Vitals BP Pulse Temp Resp Height(growth percentile) Weight(growth percentile) 137/84 (BP 1 Location: Right arm, BP Patient Position: Sitting) 78 97.5 °F (36.4 °C) (Oral) 16 5' 4\" (1.626 m) 188 lb (85.3 kg) SpO2 BMI OB Status Smoking Status 95% 32.27 kg/m2 Postmenopausal Former Smoker Vitals History BMI and BSA Data Body Mass Index Body Surface Area  
 32.27 kg/m 2 1.96 m 2 Preferred Pharmacy Pharmacy Name Phone [facility] [address], [address] Your Updated Medication List  
  
   
This list is accurate as of 18 10:28 AM.  Always use your most recent med list.  
  
  
  
  
 IRON (FERROUS SULFATE) PO Take  by mouth.  
  
 levothyroxine 25 mcg tablet Commonly known as:  synthroid Take 1 Tab by mouth once over twenty-four (24) hours for 90 days. MULTIVITAMIN PO Take  by mouth. NICODERM CQ 21 mg/24 hr  
Generic drug:  nicotine NICOrelief 4 mg gum Generic drug:  nicotine Prescriptions Sent to Pharmacy Refills  
 levothyroxine (SYNTHROID) 25 mcg tablet 0 Sig: Take 1 Tab by mouth once over twenty-four (24) hours for 90 days. Class: Normal  
 Pharmacy: [address], [address] #: [phone] Route: Oral  
  
We Performed the Following REFERRAL TO ENDOCRINOLOGY [TUX38 Custom] REFERRAL TO GASTROENTEROLOGY [ENN50 Custom] Comments:  
 Please evaluate patient for need for colonoscopy. REFERRAL TO NEPHROLOGY [FVR01 Custom] REFERRAL TO OPHTHALMOLOGY [REF57 Custom] Comments:  
 Please evaluate patient for vision changes. To-Do List   
 07/06/2018 Lab:  CBC WITH AUTOMATED DIFF   
  
 07/06/2018 Lab:  HEMOGLOBIN A1C WITH EAG   
  
 07/06/2018 Lab:  LIPID PANEL   
  
 07/06/2018 Lab:  METABOLIC PANEL, COMPREHENSIVE   
  
 07/06/2018 Lab:  T3 TOTAL   
  
 07/06/2018 Lab:  T4, FREE   
  
 07/06/2018 Lab:  TSH 3RD GENERATION Referral Information Referral ID Referred By Referred To  
  
 [id number] Karyle Spies Not Available Visits Status Start Date End Date 1 New Request 7/6/18 7/6/19 If your referral has a status of pending review or denied, additional information will be sent to support the outcome of this decision. Referral ID Referred By Referred To  
 [id number] Karyle Spies Not Available Visits Status Start Date End Date 1 New Request 7/6/18 7/6/19 If your referral has a status of pending review or denied, additional information will be sent to support the outcome of this decision. Referral ID Referred By Referred To  
 [id number] Taylor Fields Not Available Visits Status Start Date End Date 1 New Request 7/6/18 7/6/19 If your referral has a status of pending review or denied, additional information will be sent to support the outcome of this decision. Referral ID Referred By Referred To  
 [id number] Taylor Fields Not Available Visits Status Start Date End Date 1 New Request 7/6/18 7/6/19 If your referral has a status of pending review or denied, additional information will be sent to support the outcome of this decision. Patient Instructions Well Visit, Women 48 to 72: Care Instructions Your Care Instructions Physical exams can help you stay healthy. Your doctor has checked your overall health and may have suggested ways to take good care of yourself. He or she also may have recommended tests. At home, you can help prevent illness with healthy eating, regular exercise, and other steps. Follow-up care is a key part of your treatment and safety. Be sure to make and go to all appointments, and call your doctor if you are having problems. It's also a good idea to know your test results and keep a list of the medicines you take. How can you care for yourself at home? · Reach and stay at a healthy weight. This will lower your risk for many problems, such as obesity, diabetes, heart disease, and high blood pressure. · Get at least 30 minutes of exercise on most days of the week. Walking is a good choice. You also may want to do other activities, such as running, swimming, cycling, or playing tennis or team sports. · Do not smoke. Smoking can make health problems worse. If you need help quitting, talk to your doctor about stop-smoking programs and medicines. These can increase your chances of quitting for good. · Protect your skin from too much sun.  When you're outdoors from 10 a.m. to 4 p.m., stay in the shade or cover up with clothing and a hat with a wide brim. Wear sunglasses that block UV rays. Even when it's cloudy, put broad-spectrum sunscreen (SPF 30 or higher) on any exposed skin. · See a dentist one or two times a year for checkups and to have your teeth cleaned. · Wear a seat belt in the car. · Limit alcohol to 1 drink a day. Too much alcohol can cause health problems. Follow your doctor's advice about when to have certain tests. These tests can spot problems early. · Cholesterol. Your doctor will tell you how often to have this done based on your age, family history, or other things that can increase your risk for heart attack and stroke. · Blood pressure. Have your blood pressure checked during a routine doctor visit. Your doctor will tell you how often to check your blood pressure based on your age, your blood pressure results, and other factors. · Mammogram. Ask your doctor how often you should have a mammogram, which is an X-ray of your breasts. A mammogram can spot breast cancer before it can be felt and when it is easiest to treat. · Pap test and pelvic exam. Ask your doctor how often you should have a Pap test. You may not need to have a Pap test as often as you used to. · Vision. Have your eyes checked every year or two or as often as your doctor suggests. Some experts recommend that you have yearly exams for glaucoma and other age-related eye problems starting at age 48. · Hearing. Tell your doctor if you notice any change in your hearing. You can have tests to find out how well you hear. · Diabetes. Ask your doctor whether you should have tests for diabetes. · Colon cancer. You should begin tests for colon cancer at age 48. You may have one of several tests. Your doctor will tell you how often to have tests based on your age and risk. Risks include whether you already had a precancerous polyp removed from your colon or whether your parents, sisters and brothers, or children have had colon cancer. · Thyroid disease. Talk to your doctor about whether to have your thyroid checked as part of a regular physical exam. Women have an increased chance of a thyroid problem. · Osteoporosis. You should begin tests for bone density at age 72. If you are younger than 72, ask your doctor whether you have factors that may increase your risk for this disease. You may want to have this test before age 72. · Heart attack and stroke risk. At least every 4 to 6 years, you should have your risk for heart attack and stroke assessed. Your doctor uses factors such as your age, blood pressure, cholesterol, and whether you smoke or have diabetes to show what your risk for a heart attack or stroke is over the next 10 years. When should you call for help? Watch closely for changes in your health, and be sure to contact your doctor if you have any problems or symptoms that concern you. Where can you learn more? Go to http://frieda-prudence.info/. Enter A267 in the search box to learn more about \"Well Visit, Women 50 to 72: Care Instructions. \" Current as of: May 12, 2017 Content Version: 11.4 © 5248-0162 [employer]. Care instructions adapted under license by [employer] (which disclaims liability or warranty for this information). If you have questions about a medical condition or this instruction, always ask your healthcare professional. [address] any warranty or liability for your use of this information. Introducing [facility]! Christie Castaneda introduces Neosens patient portal. Now you can access parts of your medical record, email your doctor's office, and request medication refills online. 1. In your internet browser, go to [email or web address] 2. Click on the First Time User? Click Here link in the Sign In box. You will see the New Member Sign Up page. 3. Enter your Neosens Access Code exactly as it appears below.  You will not need to use this code after youve completed the sign-up process. If you do not sign up before the expiration date, you must request a new code. · SpectraRep Access Code: [email or web address] Expires: 7/25/2018  8:51 AM 
 
4. Enter the last four digits of your Social Security Number (xxxx) and Date of Birth (mm/dd/yyyy) as indicated and click Submit. You will be taken to the next sign-up page. 5. Create a SpectraRep ID. This will be your SpectraRep login ID and cannot be changed, so think of one that is secure and easy to remember. 6. Create a SpectraRep password. You can change your password at any time. 7. Enter your Password Reset Question and Answer. This can be used at a later time if you forget your password. 8. Enter your e-mail address. You will receive e-mail notification when new information is available in [address] 9. Click Sign Up. You can now view and download portions of your medical record. 10. Click the Download Summary menu link to download a portable copy of your medical information. If you have questions, please visit the Frequently Asked Questions section of the SpectraRep website. Remember, SpectraRep is NOT to be used for urgent needs. For medical emergencies, dial 911. Now available from your iPhone and Android! Please provide this summary of care documentation to your next provider. Your primary care clinician is listed as Carlos Alonzo. If you have any questions after today's visit, please call [phone].

## 2018-07-06 NOTE — LETTER
7/9/2018 9:41 AM 
 
Ms. Herbert Hairston [address] [place] [address] [phone] Dear Herbert Hairston: Please find your most recent results below. Resulted Orders CBC WITH AUTOMATED DIFF Result Value Ref Range WBC 4.9 4.6 - 13.2 K/uL  
 RBC 4.64 4.20 - 5.30 M/uL  
 HGB 13.7 12.0 - 16.0 g/dL HCT 43.0 35.0 - 45.0 % MCV 92.7 74.0 - 97.0 FL  
 MCH 29.5 24.0 - 34.0 PG  
 MCHC 31.9 31.0 - 37.0 g/dL  
 RDW 14.1 11.6 - 14.5 % PLATELET 375 178 - 482 K/uL MPV 10.3 9.2 - 11.8 FL  
 NEUTROPHILS 52 40 - 73 % LYMPHOCYTES 37 21 - 52 % MONOCYTES 8 3 - 10 % EOSINOPHILS 2 0 - 5 % BASOPHILS 1 0 - 2 %  
 ABS. NEUTROPHILS 2.6 1.8 - 8.0 K/UL  
 ABS. LYMPHOCYTES 1.8 0.9 - 3.6 K/UL  
 ABS. MONOCYTES 0.4 0.05 - 1.2 K/UL  
 ABS. EOSINOPHILS 0.1 0.0 - 0.4 K/UL  
 ABS. BASOPHILS 0.1 (H) 0.0 - 0.06 K/UL  
 DF AUTOMATED    
TSH 3RD GENERATION Result Value Ref Range TSH 1.90 0.36 - 3.74 uIU/mL  
T3 TOTAL Result Value Ref Range T3, total 109 71 - 180 ng/dL Comment:  
   (NOTE) Performed At: [address] [id number] Rima Wilburn MD ZH:[id number] T4, FREE Result Value Ref Range T4, Free 0.8 0.7 - 1.5 NG/DL  
LIPID PANEL Result Value Ref Range LIPID PROFILE Cholesterol, total 186 <200 MG/DL Triglyceride 101 <150 MG/DL Comment:  
   The drugs N-acetylcysteine (NAC) and 
Metamiszole have been found to cause falsely 
low results in this chemical assay. Please 
be sure to submit blood samples obtained BEFORE administration of either of these 
drugs to assure correct results. HDL Cholesterol 66 (H) 40 - 60 MG/DL  
 LDL, calculated 99.8 0 - 100 MG/DL VLDL, calculated 20.2 MG/DL  
 CHOL/HDL Ratio 2.8 0 - 5.0 HEMOGLOBIN A1C WITH EAG Result Value Ref Range Hemoglobin A1c 5.5 4.2 - 5.6 % Comment:  
   (NOTE) HbA1C Interpretive Ranges <5.7              Normal 
 5.7 - 6.4         Consider Prediabetes >6.5              Consider Diabetes Est. average glucose 111 mg/dL Comment:  
   (NOTE) The eAG should be interpreted with patient characteristics in mind  
since ethnicity, interindividual differences, red cell lifespan,  
variation in rates of glycation, etc. may affect the validity of the  
calculation. METABOLIC PANEL, COMPREHENSIVE Result Value Ref Range Sodium 143 136 - 145 mmol/L Potassium 4.2 3.5 - 5.5 mmol/L Chloride 108 100 - 108 mmol/L  
 CO2 26 21 - 32 mmol/L Anion gap 9 3.0 - 18 mmol/L Glucose 86 74 - 99 mg/dL BUN 16 7.0 - 18 MG/DL Creatinine 0.68 0.6 - 1.3 MG/DL  
 BUN/Creatinine ratio 24 (H) 12 - 20 GFR est AA >60 >60 ml/min/1.73m2 GFR est non-AA >60 >60 ml/min/1.73m2 Comment:  
   (NOTE) Estimated GFR is calculated using the Modification of Diet in Renal  
Disease (MDRD) Study equation, reported for both  Americans [facility]) and non- Americans (GFRNA), and normalized to 1.73m2  
body surface area. The physician must decide which value applies to  
the patient. The MDRD study equation should only be used in  
individuals age 25 or older. It has not been validated for the  
following: pregnant women, patients with serious comorbid conditions,  
or on certain medications, or persons with extremes of body size,  
muscle mass, or nutritional status. Calcium 8.9 8.5 - 10.1 MG/DL Bilirubin, total 0.6 0.2 - 1.0 MG/DL  
 ALT (SGPT) 23 13 - 56 U/L  
 AST (SGOT) 17 15 - 37 U/L Alk. phosphatase 106 45 - 117 U/L Protein, total 6.8 6.4 - 8.2 g/dL Albumin 4.0 3.4 - 5.0 g/dL Globulin 2.8 2.0 - 4.0 g/dL A-G Ratio 1.4 0.8 - 1.7 RECOMMENDATIONS: 
\"None. Keep up the good work! Please call me if you have any questions: [phone] Sincerely, Jerel Phillips LPN

## 2018-07-06 NOTE — PROGRESS NOTES
Identified pt with two pt identifiers(name and ). Reviewed record in preparation for visit and have obtained necessary documentation. Charles Merino presents today for   Chief Complaint   Patient presents with   [address] Thyroid Problem       John Jose Love preferred language for health care discussion is english/other. Is someone accompanying this pt? No    Is the patient using any DME equipment during OV? No    Depression Screening:  PHQ over the last two weeks 2018 2018 2017 2017 5/10/2016   Little interest or pleasure in doing things Not at all Not at all Not at all Not at all Not at all   Feeling down, depressed or hopeless Not at all Not at all Not at all Not at all Not at all   Total Score PHQ 2 0 0 0 0 0       Learning Assessment:  Learning Assessment 2018   PRIMARY LEARNER Patient   HIGHEST LEVEL OF EDUCATION - PRIMARY LEARNER  2 YEARS [address] CAREGIVER No   PRIMARY LANGUAGE ENGLISH    NEED No   LEARNER PREFERENCE PRIMARY DEMONSTRATION   LEARNING SPECIAL TOPICS no   ANSWERED BY patient   RELATIONSHIP SELF   ASSESSMENT COMMENT none       Abuse Screening:    Abuse Screening Questionnaire 2018   Do you ever feel afraid of your partner? N   Are you in a relationship with someone who physically or mentally threatens you? N   Is it safe for you to go home? Y       Fall Risk  Fall Risk Assessment, last 12 mths 2018   Able to walk? Yes   Fall in past 12 months? No     Health Maintenance reviewed and discussed per provider. Yes  Please order/place referral if appropriate. Advance Directive:  1. Do you have an advance directive in place? Patient Reply: No    2. If not, would you like material regarding how to put one in place? Patient Reply: No    Coordination of Care:  1. Have you been to the ER, urgent care clinic since your last visit? Hospitalized since your last visit? No    2.  Have you seen or consulted any other health care providers outside of the [address] [place] [address] since your last visit? Include any pap smears or colon screening.  No

## 2018-07-07 LAB
ALBUMIN SERPL-MCNC: 4 G/DL (ref 3.4–5)
ALBUMIN/GLOB SERPL: 1.4 {RATIO} (ref 0.8–1.7)
ALP SERPL-CCNC: 106 U/L (ref 45–117)
ALT SERPL-CCNC: 23 U/L (ref 13–56)
ANION GAP SERPL CALC-SCNC: 9 MMOL/L (ref 3–18)
AST SERPL-CCNC: 17 U/L (ref 15–37)
BILIRUB SERPL-MCNC: 0.6 MG/DL (ref 0.2–1)
BUN SERPL-MCNC: 16 MG/DL (ref 7–18)
BUN/CREAT SERPL: 24 (ref 12–20)
CALCIUM SERPL-MCNC: 8.9 MG/DL (ref 8.5–10.1)
CHLORIDE SERPL-SCNC: 108 MMOL/L (ref 100–108)
CHOLEST SERPL-MCNC: 186 MG/DL
CO2 SERPL-SCNC: 26 MMOL/L (ref 21–32)
CREAT SERPL-MCNC: 0.68 MG/DL (ref 0.6–1.3)
GLOBULIN SER CALC-MCNC: 2.8 G/DL (ref 2–4)
GLUCOSE SERPL-MCNC: 86 MG/DL (ref 74–99)
HDLC SERPL-MCNC: 66 MG/DL (ref 40–60)
HDLC SERPL: 2.8 {RATIO} (ref 0–5)
LDLC SERPL CALC-MCNC: 99.8 MG/DL (ref 0–100)
LIPID PROFILE,FLP: ABNORMAL
POTASSIUM SERPL-SCNC: 4.2 MMOL/L (ref 3.5–5.5)
PROT SERPL-MCNC: 6.8 G/DL (ref 6.4–8.2)
SODIUM SERPL-SCNC: 143 MMOL/L (ref 136–145)
T3 SERPL-MCNC: 109 NG/DL (ref 71–180)
T4 FREE SERPL-MCNC: 0.8 NG/DL (ref 0.7–1.5)
TRIGL SERPL-MCNC: 101 MG/DL (ref ?–150)
TSH SERPL DL<=0.05 MIU/L-ACNC: 1.9 UIU/ML (ref 0.36–3.74)
VLDLC SERPL CALC-MCNC: 20.2 MG/DL

## 2018-07-09 ENCOUNTER — TELEPHONE (OUTPATIENT)
Dept: INTERNAL MEDICINE CLINIC | Age: 63
End: 2018-07-09

## 2018-07-10 ENCOUNTER — TELEPHONE (OUTPATIENT)
Dept: INTERNAL MEDICINE CLINIC | Age: 63
End: 2018-07-10

## 2018-07-10 NOTE — TELEPHONE ENCOUNTER
Attempted to contact pt at  number, no answer. Lvm for pt to return call to office at [phone]. Will continue to try to contact pt.     ----- Message from Madan Best NP sent at 7/9/2018  9:08 AM EDT -----  All labs are good.

## 2018-07-20 ENCOUNTER — TELEPHONE (OUTPATIENT)
Dept: INTERNAL MEDICINE CLINIC | Age: 63
End: 2018-07-20

## 2018-07-20 NOTE — TELEPHONE ENCOUNTER
Please contact pt so that we can get a medical records release and get her previous providers notes and previous imaging.

## 2018-07-20 NOTE — TELEPHONE ENCOUNTER
Incoming call from [place] with  [facility] office asking for more information regarding pt history of kidney lesions stated understanding and informed her that the information was a verbal history given by the patient and we have not received any outside medical history as of yet she stated understanding and that they will contact pt and get more info stated understanding no other questions or concerns noted at this time.

## 2018-07-20 NOTE — LETTER
7/27/2018 7:24 AM 
 
Ms. Herbert Hairston [facility] [place] [phone] I hope this letter finds you well. I am a Licensed Practical Nurse with [address], we have attempted to contact you on several occasion and need you to come into the office to complete a medical record release so that we can contact your previous doctors and get your records. We need this to ensure we have the most accurate and up to date information in your medical record. If you have any questions please contact our office at [phone]. As always, Your good health is important to us and our goal is to be your partner in life-long wellness.  
 
 
 
 
 
Sincerely, 
 
 
Ashleigh Queen LPN

## 2018-07-24 NOTE — TELEPHONE ENCOUNTER
Attempted to contact pt at  number, no answer. Lvm for pt to return call to office at [phone] . Will continue to try to contact pt.

## 2018-07-26 NOTE — TELEPHONE ENCOUNTER
Attempted to contact pt at  number, no answer. Lvm for pt to return call to office at [phone]. Will continue to try to contact pt.

## 2018-07-27 NOTE — TELEPHONE ENCOUNTER
Attempted to reach patient regarding release for medical records. No answer; left message for pt to return call to the office at [phone]. Attempted to contact patient on several occasions no answers the following encounter will be closed.   A letter will be sent

## 2018-10-03 ENCOUNTER — OFFICE VISIT (OUTPATIENT)
Dept: INTERNAL MEDICINE CLINIC | Age: 63
End: 2018-10-03

## 2018-10-03 VITALS
DIASTOLIC BLOOD PRESSURE: 79 MMHG | OXYGEN SATURATION: 99 % | WEIGHT: 193 LBS | HEART RATE: 72 BPM | TEMPERATURE: 96.9 F | HEIGHT: 64 IN | RESPIRATION RATE: 18 BRPM | BODY MASS INDEX: 32.95 KG/M2 | SYSTOLIC BLOOD PRESSURE: 133 MMHG

## 2018-10-03 DIAGNOSIS — M25.472 ANKLE EDEMA, BILATERAL: ICD-10-CM

## 2018-10-03 DIAGNOSIS — E03.8 OTHER SPECIFIED HYPOTHYROIDISM: Primary | ICD-10-CM

## 2018-10-03 DIAGNOSIS — Z86.39 HISTORY OF THYROID NODULE: ICD-10-CM

## 2018-10-03 DIAGNOSIS — M79.606 PAIN OF ANTERIOR LOWER EXTREMITY, UNSPECIFIED LATERALITY: ICD-10-CM

## 2018-10-03 DIAGNOSIS — M25.471 ANKLE EDEMA, BILATERAL: ICD-10-CM

## 2018-10-03 RX ORDER — IRON BG,PS/VITC/B12/FA/CALCIUM 150MG-60-1
CAPSULE ORAL
Refills: 0 | COMMUNITY
Start: 2018-09-04

## 2018-10-03 RX ORDER — LEVOTHYROXINE SODIUM 25 UG/1
25 TABLET ORAL
Qty: 90 TAB | Refills: 0 | Status: SHIPPED | OUTPATIENT
Start: 2018-10-03 | End: 2019-01-01

## 2018-10-03 NOTE — PROGRESS NOTES
HISTORY OF PRESENT ILLNESS  Johnny Love is a 60 y.o. female. Patient presents for hypothyroid f/u,also c/o of bilateral ankle swelling,pain,nubmness x 2 days. This patient was seen by this provider in April and she was already complaining about ankle swelling/pain. States the pain is now radiating to her right upper leg and she also has some bilateral foot tingling. States she has been using the compressions stockings and she is elevating her legs,she has cut her standing hours at work form 12 to hours. Patient denies any HA,blurry vision, unilateral weakness, slurred speech,facial drooling,drooping, NV,dizziness,palpitations, malaise,faigue,confusion,SOB,CP. Ankle Pain    The history is provided by the patient. This is a chronic problem. The problem has not changed since onset. The pain is present in the left ankle. The pain is at a severity of 4/10. Associated symptoms include numbness. Pertinent negatives include full range of motion, no stiffness, no tingling, no itching, no back pain and no neck pain. The symptoms are aggravated by standing. Review of Systems   Constitutional: Negative. HENT: Negative. Eyes: Negative. Respiratory: Negative. Cardiovascular: Negative. Bilateral lateral malleolus swelling   Gastrointestinal: Negative. Genitourinary: Negative. Musculoskeletal: Positive for joint pain. Negative for back pain, neck pain and stiffness. Bilateral ankle swelling,pain, good dorsalis pedis pulses bilaterally, feet with normal sensation and temperature, good cap refill bilaterally. Skin: Negative. Negative for itching. Neurological: Positive for numbness. Negative for tingling. Psychiatric/Behavioral: Negative. Physical Exam   Constitutional: She is oriented to person, place, and time. She appears well-developed and well-nourished.    /79 (BP 1 Location: Right arm, BP Patient Position: Sitting)  Pulse 72  Temp 96.9 °F (36.1 °C) (Oral) Resp 18  Ht 5' 4\" (1.626 m)  Wt 193 lb (87.5 kg)  SpO2 99%  BMI 33.13 kg/m2     HENT:   Head: Normocephalic and atraumatic. Eyes: Conjunctivae and EOM are normal. Pupils are equal, round, and reactive to light. Neck: Normal range of motion. Cardiovascular: Normal rate. Pulmonary/Chest: Effort normal and breath sounds normal.   Abdominal: Soft. Bowel sounds are normal.   Musculoskeletal: Normal range of motion. Neurological: She is alert and oriented to person, place, and time. Skin: Skin is warm and dry. Psychiatric: She has a normal mood and affect. Her behavior is normal. Judgment and thought content normal.   Vitals reviewed. ASSESSMENT and PLAN    ICD-10-CM ICD-9-CM    1. Other specified hypothyroidism E03.8 244.8 TSH AND FREE T4      METABOLIC PANEL, COMPREHENSIVE   2. History of thyroid nodule Z86.39 V12.29 levothyroxine (SYNTHROID) 25 mcg tablet   3. Pain of anterior lower extremity, unspecified laterality M79.606 729.5 DUPLEX LOWER EXT VENOUS BILAT   4. Ankle edema, bilateral M25.471 719.07 DUPLEX LOWER EXT VENOUS BILAT    M25.472       Encounter Diagnoses   Name Primary?  Other specified hypothyroidism Yes    History of thyroid nodule     Pain of anterior lower extremity, unspecified laterality     Ankle edema, bilateral      Orders Placed This Encounter    TSH AND FREE T4    METABOLIC PANEL, COMPREHENSIVE    BINDU FORTE 233-26-01-1 mg-mg-mcg-mg cap    levothyroxine (SYNTHROID) 25 mcg tablet     Orders Placed This Encounter    TSH AND FREE T4     Standing Status:   Future     Standing Expiration Date:   17/9/2819    METABOLIC PANEL, COMPREHENSIVE     Standing Status:   Future     Standing Expiration Date:   10/4/2019    levothyroxine (SYNTHROID) 25 mcg tablet     Sig: Take 1 Tab by mouth once over twenty-four (24) hours for 90 days.      Dispense:  90 Tab     Refill:  0     Orders Placed This Encounter    TSH AND FREE T4    METABOLIC PANEL, COMPREHENSIVE    levothyroxine (SYNTHROID) 25 mcg tablet     Diagnoses and all orders for this visit:    1. Other specified hypothyroidism  -     TSH AND FREE T4; Future  -     METABOLIC PANEL, COMPREHENSIVE; Future    2. History of thyroid nodule  -     levothyroxine (SYNTHROID) 25 mcg tablet; Take 1 Tab by mouth once over twenty-four (24) hours for 90 days. 3. Pain of anterior lower extremity, unspecified laterality  -     DUPLEX LOWER EXT VENOUS BILAT; Future    4. Ankle edema, bilateral  -     DUPLEX LOWER EXT VENOUS BILAT; Future      Follow-up Disposition:  Return in about 3 months (around 1/3/2019). the following changes in treatment are made: Cont with compression stockings, weight loss,dietary changes, leg elevation,daily exercise. Will R/O acute process with doppler US, will consider short course diuretics with negative duplex.

## 2018-10-03 NOTE — MR AVS SNAPSHOT
[facility] 
 
 
 [address] [facility] [address] [address] 
[phone] Patient: Sherwin Mendenhall MRN: [id number] :1955 Visit Information Date & Time Provider Department Dept. Phone Encounter #  
 10/3/2018  8:30 AM Jenifer Wood NP [email or web address] [phone] [id number] Follow-up Instructions Return in about 3 months (around 1/3/2019). Upcoming Health Maintenance Date Due Hepatitis C Screening 1955 DTaP/Tdap/Td series (1 - Tdap) 1975 PAP AKA CERVICAL CYTOLOGY 1975 Shingrix Vaccine Age 50> (1 of 2) 2004 BREAST CANCER SCRN MAMMOGRAM 2004 FOBT Q 1 YEAR AGE 50-75 2004 MEDICARE YEARLY EXAM 3/14/2018 Influenza Age 5 to Adult 2018 Allergies as of 10/3/2018  Review Complete On: 10/3/2018 By: Deejay Jarrell LPN Severity Noted Reaction Type Reactions Ambien [Zolpidem]  2010    Anxiety Current Immunizations  Never Reviewed No immunizations on file. Not reviewed this visit You Were Diagnosed With   
  
 Codes Comments Other specified hypothyroidism    -  Primary ICD-10-CM: E03.8 ICD-9-CM: 244.8 History of thyroid nodule     ICD-10-CM: Z86.39 
ICD-9-CM: V12.29 Pain of anterior lower extremity, unspecified laterality     ICD-10-CM: M79.606 ICD-9-CM: 729.5 Ankle edema, bilateral     ICD-10-CM: M25.471, M25.472 ICD-9-CM: 719.07 Vitals BP Pulse Temp Resp Height(growth percentile) Weight(growth percentile) 133/79 (BP 1 Location: Right arm, BP Patient Position: Sitting) 72 96.9 °F (36.1 °C) (Oral) 18 5' 4\" (1.626 m) 193 lb (87.5 kg) SpO2 BMI OB Status Smoking Status 99% 33.13 kg/m2 Postmenopausal Former Smoker Vitals History BMI and BSA Data Body Mass Index Body Surface Area  
 33.13 kg/m 2 1.99 m 2 Preferred Pharmacy Pharmacy Name Phone [facility] [address], [address] [place] [address] Your Updated Medication List  
  
   
This list is accurate as of 10/3/18  8:58 AM.  Always use your most recent med list.  
  
  
  
  
 IRON (FERROUS SULFATE) PO Take  by mouth.  
  
 levothyroxine 25 mcg tablet Commonly known as:  synthroid Take 1 Tab by mouth once over twenty-four (24) hours for 90 days. MULTIVITAMIN PO Take  by mouth. Bebo Dura 861-34-03-3 mg-mg-mcg-mg Cap Generic drug:  Iron BisGl &PS [id number] Prescriptions Sent to Pharmacy Refills  
 levothyroxine (SYNTHROID) 25 mcg tablet 0 Sig: Take 1 Tab by mouth once over twenty-four (24) hours for 90 days. Class: Normal  
 [facility] [address] [address] [facility]  #: [phone] Route: Oral  
  
Follow-up Instructions Return in about 3 months (around 1/3/2019). To-Do List   
 10/03/2018 Vascular/US:  DUPLEX LOWER EXT VENOUS BILAT   
  
 10/03/2018 Lab:  METABOLIC PANEL, COMPREHENSIVE   
  
 10/03/2018 Lab:  TSH AND FREE T4 Introducing [facility]! [facility] introduces [employer] patient portal. Now you can access parts of your medical record, email your doctor's office, and request medication refills online. 1. In your internet browser, go to [email or web address] 2. Click on the First Time User? Click Here link in the Sign In box. You will see the New Member Sign Up page. 3. Enter your [employer] Access Code exactly as it appears below. You will not need to use this code after youve completed the sign-up process. If you do not sign up before the expiration date, you must request a new code. · [employer] Access Code: [email or web address] Expires: 1/1/2019  8:58 AM 
 
4. Enter the last four digits of your Social Security Number (xxxx) and Date of Birth (mm/dd/yyyy) as indicated and click Submit.  You will be taken to the next sign-up page. 5. Create a [employer] ID. This will be your [employer] login ID and cannot be changed, so think of one that is secure and easy to remember. 6. Create a [employer] password. You can change your password at any time. 7. Enter your Password Reset Question and Answer. This can be used at a later time if you forget your password. 8. Enter your e-mail address. You will receive e-mail notification when new information is available in [address]. 9. Click Sign Up. You can now view and download portions of your medical record. 10. Click the Download Summary menu link to download a portable copy of your medical information. If you have questions, please visit the Frequently Asked Questions section of the [employer] website. Remember, [employer] is NOT to be used for urgent needs. For medical emergencies, dial 911. Now available from your iPhone and Android! Please provide this summary of care documentation to your next provider. Your primary care clinician is listed as Milagros Apple. If you have any questions after today's visit, please call [phone].

## 2018-10-03 NOTE — PROGRESS NOTES
Patient presents for hypothyroid f/u,also c/o of bilateral ankle swelling,pain,nubmness x 2 days. This patient was seen by this provider in April and she was already complaining about ankle swelling/pain. States the pain is now radiating to her right upper leg and she also has some bilateral foot tingling. States she has been using the compressions stockings and she is elevating her legs,she has cut her standing hours at work form 12 to hours. Patient denies any HA,blurry vision, unilateral weakness, slurred speech,facial drooling,drooping, NV,dizziness,palpitations, malaise,faigue,confusion,SOB,CP.

## 2018-10-03 NOTE — PROGRESS NOTES
ROOM # 1    Kate Love presents today for   Chief Complaint   Patient presents with    Ankle Pain     numbness with pain x2 days       Angelia Love preferred language for health care discussion is english/other. Is someone accompanying this pt? no    Is the patient using any DME equipment during OV? no    Depression Screening:  PHQ over the last two weeks 10/3/2018 7/6/2018 4/26/2018 6/8/2017 6/8/2017 5/10/2016   Little interest or pleasure in doing things Not at all Not at all Not at all Not at all Not at all Not at all   Feeling down, depressed, irritable, or hopeless Not at all Not at all Not at all Not at all Not at all Not at all   Total Score PHQ 2 0 0 0 0 0 0       Learning Assessment:  Learning Assessment 7/6/2018   PRIMARY LEARNER Patient   HIGHEST LEVEL OF EDUCATION - PRIMARY LEARNER  2 YEARS [address] CAREGIVER No   PRIMARY LANGUAGE ENGLISH    NEED No   LEARNER PREFERENCE PRIMARY DEMONSTRATION   LEARNING SPECIAL TOPICS no   ANSWERED BY patient   RELATIONSHIP SELF   ASSESSMENT COMMENT none       Abuse Screening:  Abuse Screening Questionnaire 7/6/2018   Do you ever feel afraid of your partner? N   Are you in a relationship with someone who physically or mentally threatens you? N   Is it safe for you to go home? Y       Fall Risk  Fall Risk Assessment, last 12 mths 7/6/2018   Able to walk? Yes   Fall in past 12 months? No       Health Maintenance reviewed and discussed per provider.  Yes    Esvin Markham is due for   Health Maintenance Due   Topic Date Due    Hepatitis C Screening  1955    DTaP/Tdap/Td series (1 - Tdap) 12/28/1975    PAP AKA CERVICAL CYTOLOGY  12/28/1975    Shingrix Vaccine Age 50> (1 of 2) 12/28/2004    BREAST CANCER SCRN MAMMOGRAM  12/28/2004    FOBT Q 1 YEAR AGE 50-75  12/28/2004    MEDICARE YEARLY EXAM  03/14/2018    Influenza Age 9 to Adult  08/01/2018         Please order/place referral if appropriate. Advance Directive:  1. Do you have an advance directive in place? Patient Reply: no    2. If not, would you like material regarding how to put one in place? Patient Reply: no    Coordination of Care:  1. Have you been to the ER, urgent care clinic since your last visit? Hospitalized since your last visit? no    2. Have you seen or consulted any other health care providers outside of the [address] [place] [address] since your last visit? Include any pap smears or colon screening.  no

## 2018-11-23 ENCOUNTER — HOSPITAL ENCOUNTER (OUTPATIENT)
Dept: MAMMOGRAPHY | Age: 63
Discharge: HOME | End: 2018-11-23
Attending: INTERNAL MEDICINE
Payer: MEDICARE

## 2018-11-23 ENCOUNTER — HOSPITAL ENCOUNTER (OUTPATIENT)
Dept: MRI IMAGING | Age: 63
Discharge: HOME | End: 2018-11-23
Attending: INTERNAL MEDICINE
Payer: MEDICARE

## 2018-11-23 ENCOUNTER — HOSPITAL ENCOUNTER (OUTPATIENT)
Dept: ULTRASOUND IMAGING | Age: 63
Discharge: HOME | End: 2018-11-23
Attending: INTERNAL MEDICINE
Payer: MEDICARE

## 2018-11-23 VITALS — WEIGHT: 185 LBS | BODY MASS INDEX: 31.76 KG/M2

## 2018-11-23 DIAGNOSIS — N28.1 RENAL CYST: ICD-10-CM

## 2018-11-23 DIAGNOSIS — Z12.31 VISIT FOR SCREENING MAMMOGRAM: ICD-10-CM

## 2018-11-23 DIAGNOSIS — E04.2 MULTIPLE THYROID NODULES: ICD-10-CM

## 2018-11-23 LAB — CREAT UR-MCNC: 0.7 MG/DL (ref 0.6–1.3)

## 2018-11-23 PROCEDURE — 77063 BREAST TOMOSYNTHESIS BI: CPT

## 2018-11-23 PROCEDURE — 82565 ASSAY OF CREATININE: CPT

## 2018-11-23 PROCEDURE — 76536 US EXAM OF HEAD AND NECK: CPT

## 2019-03-25 ENCOUNTER — HOSPITAL ENCOUNTER (EMERGENCY)
Age: 64
Discharge: HOME | End: 2019-03-25
Attending: EMERGENCY MEDICINE
Payer: MEDICARE

## 2019-03-25 ENCOUNTER — APPOINTMENT (OUTPATIENT)
Dept: GENERAL RADIOLOGY | Age: 64
End: 2019-03-25
Attending: EMERGENCY MEDICINE
Payer: MEDICARE

## 2019-03-25 VITALS
TEMPERATURE: 97.6 F | HEART RATE: 107 BPM | RESPIRATION RATE: 18 BRPM | SYSTOLIC BLOOD PRESSURE: 143 MMHG | DIASTOLIC BLOOD PRESSURE: 88 MMHG | OXYGEN SATURATION: 96 %

## 2019-03-25 DIAGNOSIS — M25.561 ACUTE PAIN OF RIGHT KNEE: Primary | ICD-10-CM

## 2019-03-25 LAB
ALBUMIN SERPL-MCNC: 3.7 G/DL (ref 3.4–5)
ALBUMIN/GLOB SERPL: 1.4 {RATIO} (ref 0.8–1.7)
ALP SERPL-CCNC: 118 U/L (ref 45–117)
ALT SERPL-CCNC: 29 U/L (ref 13–56)
ANION GAP SERPL CALC-SCNC: 4 MMOL/L (ref 3–18)
AST SERPL-CCNC: 19 U/L (ref 15–37)
BASOPHILS # BLD: 0 K/UL (ref 0–0.1)
BASOPHILS NFR BLD: 0 % (ref 0–2)
BILIRUB SERPL-MCNC: 0.5 MG/DL (ref 0.2–1)
BUN SERPL-MCNC: 22 MG/DL (ref 7–18)
BUN/CREAT SERPL: 17 (ref 12–20)
CALCIUM SERPL-MCNC: 8.1 MG/DL (ref 8.5–10.1)
CHLORIDE SERPL-SCNC: 111 MMOL/L (ref 100–108)
CO2 SERPL-SCNC: 28 MMOL/L (ref 21–32)
CREAT SERPL-MCNC: 1.32 MG/DL (ref 0.6–1.3)
D DIMER PPP FEU-MCNC: 0.35 UG/ML(FEU)
DIFFERENTIAL METHOD BLD: ABNORMAL
EOSINOPHIL # BLD: 0.1 K/UL (ref 0–0.4)
EOSINOPHIL NFR BLD: 2 % (ref 0–5)
ERYTHROCYTE [DISTWIDTH] IN BLOOD BY AUTOMATED COUNT: 13.6 % (ref 11.6–14.5)
GLOBULIN SER CALC-MCNC: 2.6 G/DL (ref 2–4)
GLUCOSE SERPL-MCNC: 101 MG/DL (ref 74–99)
HCT VFR BLD AUTO: 37.6 % (ref 35–45)
HGB BLD-MCNC: 12.3 G/DL (ref 12–16)
LYMPHOCYTES # BLD: 2.5 K/UL (ref 0.9–3.6)
LYMPHOCYTES NFR BLD: 31 % (ref 21–52)
MCH RBC QN AUTO: 30.5 PG (ref 24–34)
MCHC RBC AUTO-ENTMCNC: 32.7 G/DL (ref 31–37)
MCV RBC AUTO: 93.3 FL (ref 74–97)
MONOCYTES # BLD: 0.6 K/UL (ref 0.05–1.2)
MONOCYTES NFR BLD: 7 % (ref 3–10)
NEUTS SEG # BLD: 4.8 K/UL (ref 1.8–8)
NEUTS SEG NFR BLD: 60 % (ref 40–73)
PLATELET # BLD AUTO: 228 K/UL (ref 135–420)
PMV BLD AUTO: 10 FL (ref 9.2–11.8)
POTASSIUM SERPL-SCNC: 3.9 MMOL/L (ref 3.5–5.5)
PROT SERPL-MCNC: 6.3 G/DL (ref 6.4–8.2)
RBC # BLD AUTO: 4.03 M/UL (ref 4.2–5.3)
SODIUM SERPL-SCNC: 143 MMOL/L (ref 136–145)
WBC # BLD AUTO: 8.1 K/UL (ref 4.6–13.2)

## 2019-03-25 PROCEDURE — 73564 X-RAY EXAM KNEE 4 OR MORE: CPT

## 2019-03-25 PROCEDURE — 99283 EMERGENCY DEPT VISIT LOW MDM: CPT

## 2019-03-25 PROCEDURE — 74011250637 HC RX REV CODE- 250/637: Performed by: EMERGENCY MEDICINE

## 2019-03-25 PROCEDURE — 85379 FIBRIN DEGRADATION QUANT: CPT

## 2019-03-25 PROCEDURE — 85025 COMPLETE CBC W/AUTO DIFF WBC: CPT

## 2019-03-25 PROCEDURE — 80053 COMPREHEN METABOLIC PANEL: CPT

## 2019-03-25 RX ORDER — BUSPIRONE HYDROCHLORIDE 10 MG/1
TABLET ORAL 2 TIMES DAILY
COMMUNITY

## 2019-03-25 RX ORDER — BUPROPION HYDROCHLORIDE 100 MG/1
TABLET ORAL
COMMUNITY

## 2019-03-25 RX ORDER — ACETAMINOPHEN 500 MG
1000 TABLET ORAL
Qty: 50 TAB | Refills: 0 | Status: SHIPPED | OUTPATIENT
Start: 2019-03-25

## 2019-03-25 RX ORDER — IBUPROFEN 600 MG/1
600 TABLET ORAL
Qty: 20 TAB | Refills: 0 | Status: SHIPPED | OUTPATIENT
Start: 2019-03-25 | End: 2019-04-17

## 2019-03-25 RX ORDER — LOSARTAN POTASSIUM 50 MG/1
100 TABLET ORAL DAILY
COMMUNITY

## 2019-03-25 RX ORDER — LEVOTHYROXINE SODIUM 25 UG/1
25 TABLET ORAL
COMMUNITY

## 2019-03-25 RX ORDER — ACETAMINOPHEN 500 MG
1000 TABLET ORAL
Status: COMPLETED | OUTPATIENT
Start: 2019-03-25 | End: 2019-03-25

## 2019-03-25 RX ORDER — IBUPROFEN 600 MG/1
600 TABLET ORAL
Status: COMPLETED | OUTPATIENT
Start: 2019-03-25 | End: 2019-03-25

## 2019-03-25 RX ADMIN — IBUPROFEN 600 MG: 600 TABLET ORAL at 22:50

## 2019-03-25 RX ADMIN — ACETAMINOPHEN 1000 MG: 500 TABLET ORAL at 22:50

## 2019-03-26 NOTE — ED PROVIDER NOTES
Mehrdad Holcomb is a 58 y.o. Female with no prior history of thromboembolic events with complaints of pain behind her right knee that started earlier today and that is come and gone throughout the day but got worse tonight. She feels like her lower right leg is more swollen than the other one. She denies any history of recent significant immobilization, cancer, surgery, estrogen replacement or prior DVT. She has taken nothing for the pain. There is no exacerbating factor or recent trauma. The history is provided by the patient and the spouse. Past Medical History:  
Diagnosis Date  Kidney lesion 2016  Thyroid disease  Thyroid lesion 2016 Bilateral  
 
 
Past Surgical History:  
Procedure Laterality Date  DELIVERY     
 DIET GASTRIC BYPASS  HX GYN  2016  
 total hysterectomy Family History:  
Problem Relation Age of Onset  Hypertension Mother 24 [facility] Arthritis-osteo Mother  Cancer Father   
     colon Social History Socioeconomic History  Marital status:  Spouse name: Not on file  Number of children: Not on file  Years of education: Not on file  Highest education level: Not on file Occupational History  Not on file Social Needs  Financial resource strain: Not on file  Food insecurity:  
  Worry: Not on file Inability: Not on file  Transportation needs:  
  Medical: Not on file Non-medical: Not on file Tobacco Use  Smoking status: Former Smoker Packs/day: 1.00 Years: 40.00 Pack years: 40.00 Last attempt to quit: 2016 Years since quittin.4  Smokeless tobacco: Never Used Substance and Sexual Activity  Alcohol use: No  
 Drug use: No  
 Sexual activity: Never Lifestyle  Physical activity:  
  Days per week: Not on file Minutes per session: Not on file  Stress: Not on file Relationships  Social connections:  
  Talks on phone: Not on file Gets together: Not on file Attends Orthodoxy service: Not on file Active member of club or organization: Not on file Attends meetings of clubs or organizations: Not on file Relationship status: Not on file  Intimate partner violence:  
  Fear of current or ex partner: Not on file Emotionally abused: Not on file Physically abused: Not on file Forced sexual activity: Not on file Other Topics Concern  Not on file Social History Narrative  Not on file ALLERGIES: Ambien [zolpidem] Review of Systems Constitutional: Negative for fever. HENT: Negative for sore throat. Eyes: Negative for visual disturbance. Respiratory: Negative for cough. Cardiovascular: Positive for leg swelling. Negative for chest pain. Gastrointestinal: Negative for abdominal pain. Genitourinary: Negative for difficulty urinating. Musculoskeletal: Positive for arthralgias. Negative for gait problem. Skin: Negative for rash. Allergic/Immunologic: Negative for immunocompromised state. Neurological: Negative for syncope. Psychiatric/Behavioral: Positive for sleep disturbance. Vitals:  
 03/25/19 2146 BP: 143/88 Pulse: (!) 107 Resp: 18 Temp: 97.6 °F (36.4 °C) SpO2: 96% Physical Exam  
Constitutional: She is oriented to person, place, and time. She appears well-developed and well-nourished. Non-toxic appearance. She does not have a sickly appearance. She does not appear ill. No distress. HENT:  
Head: Normocephalic and atraumatic. Right Ear: External ear normal.  
Left Ear: External ear normal.  
Nose: Nose normal.  
Mouth/Throat: Uvula is midline, oropharynx is clear and moist and mucous membranes are normal. No oropharyngeal exudate. Eyes: Conjunctivae are normal. No scleral icterus. Neck: Neck supple. Cardiovascular: Normal rate, regular rhythm, normal heart sounds and intact distal pulses. Pulmonary/Chest: Effort normal and breath sounds normal.  
Abdominal: Soft. There is no tenderness. Musculoskeletal: She exhibits no edema. Legs: 
Neurological: She is alert and oriented to person, place, and time. Gait normal.  
Skin: Skin is warm and dry. She is not diaphoretic. Psychiatric: Her behavior is normal.  
Nursing note and vitals reviewed. MDM Procedures Vitals: 
Patient Vitals for the past 12 hrs: 
 Temp Pulse Resp BP SpO2  
03/25/19 2146 97.6 °F (36.4 °C) (!) 107 18 143/88 96 % Medications ordered:  
Medications  
ibuprofen (MOTRIN) tablet 600 mg (600 mg Oral Given 3/25/19 2250)  
acetaminophen (TYLENOL) tablet 1,000 mg (1,000 mg Oral Given 3/25/19 2250) Lab findings: 
Recent Results (from the past 12 hour(s)) CBC WITH AUTOMATED DIFF Collection Time: 03/25/19 10:40 PM  
Result Value Ref Range WBC 8.1 4.6 - 13.2 K/uL  
 RBC 4.03 (L) 4.20 - 5.30 M/uL  
 HGB 12.3 12.0 - 16.0 g/dL HCT 37.6 35.0 - 45.0 % MCV 93.3 74.0 - 97.0 FL  
 MCH 30.5 24.0 - 34.0 PG  
 MCHC 32.7 31.0 - 37.0 g/dL  
 RDW 13.6 11.6 - 14.5 % PLATELET 329 438 - 282 K/uL MPV 10.0 9.2 - 11.8 FL  
 NEUTROPHILS 60 40 - 73 % LYMPHOCYTES 31 21 - 52 % MONOCYTES 7 3 - 10 % EOSINOPHILS 2 0 - 5 % BASOPHILS 0 0 - 2 %  
 ABS. NEUTROPHILS 4.8 1.8 - 8.0 K/UL  
 ABS. LYMPHOCYTES 2.5 0.9 - 3.6 K/UL  
 ABS. MONOCYTES 0.6 0.05 - 1.2 K/UL  
 ABS. EOSINOPHILS 0.1 0.0 - 0.4 K/UL  
 ABS. BASOPHILS 0.0 0.0 - 0.1 K/UL  
 DF AUTOMATED METABOLIC PANEL, COMPREHENSIVE Collection Time: 03/25/19 10:40 PM  
Result Value Ref Range Sodium 143 136 - 145 mmol/L Potassium 3.9 3.5 - 5.5 mmol/L Chloride 111 (H) 100 - 108 mmol/L  
 CO2 28 21 - 32 mmol/L Anion gap 4 3.0 - 18 mmol/L Glucose 101 (H) 74 - 99 mg/dL BUN 22 (H) 7.0 - 18 MG/DL Creatinine 1.32 (H) 0.6 - 1.3 MG/DL  
 BUN/Creatinine ratio 17 12 - 20 GFR est AA 49 (L) >60 ml/min/1.73m2 GFR est non-AA 41 (L) >60 ml/min/1.73m2 Calcium 8.1 (L) 8.5 - 10.1 MG/DL Bilirubin, total 0.5 0.2 - 1.0 MG/DL  
 ALT (SGPT) 29 13 - 56 U/L  
 AST (SGOT) 19 15 - 37 U/L Alk. phosphatase 118 (H) 45 - 117 U/L Protein, total 6.3 (L) 6.4 - 8.2 g/dL Albumin 3.7 3.4 - 5.0 g/dL Globulin 2.6 2.0 - 4.0 g/dL A-G Ratio 1.4 0.8 - 1.7 D DIMER Collection Time: 03/25/19 10:40 PM  
Result Value Ref Range D DIMER 0.35 <0.46 ug/ml(FEU) EKG interpretation by ED Physician: X-Ray, CT or other radiology findings or impressions: 
XR KNEE RT MIN 4 V    (Results Pending) Nothing acute per my interpretation Progress notes, Consult notes or additional Procedure notes:  
Low suspicion for DVT or infection or other significant pathology requiring other workup at this time. Will refer patient for outpatient workup. I have discussed with patient and/or family/sig other the results, interpretation of any imaging if performed, suspected diagnosis and treatment plan to include instructions regarding the diagnoses listed to which understanding was expressed with all questions answered Reevaluation of patient:  
stable Disposition: 
Diagnosis: 1. Acute pain of right knee Disposition: home Follow-up Information Follow up With Specialties Details Why Contact Info Adriana Garcia MD Geriatric Medicine Schedule an appointment as soon as possible for a visit this week for recheck [address] [phone] 
  
 call [phone] to make appointment for PVL of your right leg [facility] EMERGENCY DEPT Emergency Medicine  If symptoms worsen [id number] 
[phone] Patient's Medications Start Taking ACETAMINOPHEN (TYLENOL EXTRA STRENGTH) 500 MG TABLET    Take 2 Tabs by mouth every six (6) hours as needed for Pain. IBUPROFEN (MOTRIN) 600 MG TABLET    Take 1 Tab by mouth every six (6) hours as needed for Pain. Continue Taking BUPROPION (WELLBUTRIN) 100 MG TABLET    Take  by mouth. BUSPIRONE (BUSPAR) 10 MG TABLET    Take 10 mg by mouth three (3) times daily. IRON, FERROUS SULFATE, PO    Take  by mouth. LEVOTHYROXINE (SYNTHROID) 25 MCG TABLET    Take 25 mcg by mouth Daily (before breakfast). LOSARTAN (COZAAR) 50 MG TABLET    Take 50 mg by mouth daily. MULTIVITAMIN PO    Take  by mouth. Carmel Cole [phone] MG-MG-MCG-MG CAP These Medications have changed No medications on file Stop Taking No medications on file

## 2019-03-26 NOTE — ED TRIAGE NOTES
Patient comes in complaining of left leg pain, states that the pain is behind her knee. Patient denies any injury, states that the pain started today.

## 2019-03-26 NOTE — DISCHARGE INSTRUCTIONS
Patient Education        Joint Pain: Care Instructions  Your Care Instructions    Many people have small aches and pains from overuse or injury to muscles and joints. Joint injuries often happen during sports or recreation, work tasks, or projects around the home. An overuse injury can happen when you put too much stress on a joint or when you do an activity that stresses the joint over and over, such as using the computer or rowing a boat. You can take action at home to help your muscles and joints get better. You should feel better in 1 to 2 weeks, but it can take 3 months or more to heal completely. Follow-up care is a key part of your treatment and safety. Be sure to make and go to all appointments, and call your doctor if you are having problems. It's also a good idea to know your test results and keep a list of the medicines you take. How can you care for yourself at home? · Do not put weight on the injured joint for at least a day or two. · For the first day or two after an injury, do not take hot showers or baths, and do not use hot packs. The heat could make swelling worse. · Put ice or a cold pack on the sore joint for 10 to 20 minutes at a time. Try to do this every 1 to 2 hours for the next 3 days (when you are awake) or until the swelling goes down. Put a thin cloth between the ice and your skin. · Wrap the injury in an elastic bandage. Do not wrap it too tightly because this can cause more swelling. · Prop up the sore joint on a pillow when you ice it or anytime you sit or lie down during the next 3 days. Try to keep it above the level of your heart. This will help reduce swelling. · Take an over-the-counter pain medicine, such as acetaminophen (Tylenol), ibuprofen (Advil, Motrin), or naproxen (Aleve). Read and follow all instructions on the label. · After 1 or 2 days of rest, begin moving the joint gently.  While the joint is still healing, you can begin to exercise using activities that do not strain or hurt the painful joint. When should you call for help? Call your doctor now or seek immediate medical care if:    · You have signs of infection, such as:  ? Increased pain, swelling, warmth, and redness. ? Red streaks leading from the joint. ? A fever.    Watch closely for changes in your health, and be sure to contact your doctor if:    · Your movement or symptoms are not getting better after 1 to 2 weeks of home treatment. Where can you learn more? Go to http://frieda-prudence.info/. Enter P205 in the search box to learn more about \"Joint Pain: Care Instructions. \"  Current as of: September 20, 2018  Content Version: 11.9  © 3775-5597 [employer]. Care instructions adapted under license by [employer] (which disclaims liability or warranty for this information). If you have questions about a medical condition or this instruction, always ask your healthcare professional. [place] [address] any warranty or liability for your use of this information. Patient Education        Knee Pain or Injury: Care Instructions  Your Care Instructions    Injuries are a common cause of knee problems. Sudden (acute) injuries may be caused by a direct blow to the knee. They can also be caused by abnormal twisting, bending, or falling on the knee. Pain, bruising, or swelling may be severe, and may start within minutes of the injury. Overuse is another cause of knee pain. Other causes are climbing stairs, kneeling, and other activities that use the knee. Everyday wear and tear, especially as you get older, also can cause knee pain. Rest, along with home treatment, often relieves pain and allows your knee to heal. If you have a serious knee injury, you may need tests and treatment. Follow-up care is a key part of your treatment and safety. Be sure to make and go to all appointments, and call your doctor if you are having problems.  It's also a good idea to know your test results and keep a list of the medicines you take. How can you care for yourself at home? · Be safe with medicines. Read and follow all instructions on the label. ? If the doctor gave you a prescription medicine for pain, take it as prescribed. ? If you are not taking a prescription pain medicine, ask your doctor if you can take an over-the-counter medicine. · Rest and protect your knee. Take a break from any activity that may cause pain. · Put ice or a cold pack on your knee for 10 to 20 minutes at a time. Put a thin cloth between the ice and your skin. · Prop up a sore knee on a pillow when you ice it or anytime you sit or lie down for the next 3 days. Try to keep it above the level of your heart. This will help reduce swelling. · If your knee is not swollen, you can put moist heat, a heating pad, or a warm cloth on your knee. · If your doctor recommends an elastic bandage, sleeve, or other type of support for your knee, wear it as directed. · Follow your doctor's instructions about how much weight you can put on your leg. Use a cane, crutches, or a walker as instructed. · Follow your doctor's instructions about activity during your healing process. If you can do mild exercise, slowly increase your activity. · Reach and stay at a healthy weight. Extra weight can strain the joints, especially the knees and hips, and make the pain worse. Losing even a few pounds may help. When should you call for help? Call 911 anytime you think you may need emergency care. For example, call if:    · You have symptoms of a blood clot in your lung (called a pulmonary embolism). These may include:  ? Sudden chest pain. ? Trouble breathing. ?  Coughing up blood.    Call your doctor now or seek immediate medical care if:    · You have severe or increasing pain.     · Your leg or foot turns cold or changes color.     · You cannot stand or put weight on your knee.     · Your knee looks twisted or bent out of shape.     · You cannot move your knee.     · You have signs of infection, such as:  ? Increased pain, swelling, warmth, or redness. ? Red streaks leading from the knee. ? Pus draining from a place on your knee. ? A fever.     · You have signs of a blood clot in your leg (called a deep vein thrombosis), such as:  ? Pain in your calf, back of the knee, thigh, or groin. ? Redness and swelling in your leg or groin.    Watch closely for changes in your health, and be sure to contact your doctor if:    · You have tingling, weakness, or numbness in your knee.     · You have any new symptoms, such as swelling.     · You have bruises from a knee injury that last longer than 2 weeks.     · You do not get better as expected. Where can you learn more? Go to http://frieda-prudence.info/. Enter K195 in the search box to learn more about \"Knee Pain or Injury: Care Instructions. \"  Current as of: September 23, 2018  Content Version: 11.9  © 3171-6070 [employer]. Care instructions adapted under license by [employer] (which disclaims liability or warranty for this information). If you have questions about a medical condition or this instruction, always ask your healthcare professional. [address] any warranty or liability for your use of this information.

## 2019-12-10 ENCOUNTER — HOSPITAL ENCOUNTER (OUTPATIENT)
Dept: MAMMOGRAPHY | Age: 64
Discharge: HOME | End: 2019-12-10
Attending: INTERNAL MEDICINE
Payer: MEDICARE

## 2019-12-10 DIAGNOSIS — Z12.31 VISIT FOR SCREENING MAMMOGRAM: ICD-10-CM

## 2019-12-10 DIAGNOSIS — Z12.31 SCREENING MAMMOGRAM FOR HIGH-RISK PATIENT: ICD-10-CM

## 2019-12-10 PROCEDURE — 77063 BREAST TOMOSYNTHESIS BI: CPT

## 2019-12-10 PROCEDURE — 77067 SCR MAMMO BI INCL CAD: CPT

## 2020-11-17 ENCOUNTER — TRANSCRIBE ORDER (OUTPATIENT)
Dept: SCHEDULING | Age: 65
End: 2020-11-17

## 2020-11-17 DIAGNOSIS — Z12.31 SCREENING MAMMOGRAM, ENCOUNTER FOR: Primary | ICD-10-CM

## 2020-11-25 ENCOUNTER — HOSPITAL ENCOUNTER (OUTPATIENT)
Dept: GENERAL RADIOLOGY | Age: 65
Discharge: HOME | End: 2020-11-25
Payer: MEDICARE

## 2020-11-25 ENCOUNTER — TRANSCRIBE ORDER (OUTPATIENT)
Dept: REGISTRATION | Age: 65
End: 2020-11-25

## 2020-11-25 DIAGNOSIS — R52 PAIN: Primary | ICD-10-CM

## 2020-11-25 DIAGNOSIS — R52 PAIN: ICD-10-CM

## 2020-11-25 PROCEDURE — 71046 X-RAY EXAM CHEST 2 VIEWS: CPT

## 2020-11-30 ENCOUNTER — TRANSCRIBE ORDER (OUTPATIENT)
Dept: SCHEDULING | Age: 65
End: 2020-11-30

## 2020-11-30 DIAGNOSIS — Z12.31 SCREENING MAMMOGRAM, ENCOUNTER FOR: Primary | ICD-10-CM

## 2020-12-16 ENCOUNTER — TRANSCRIBE ORDER (OUTPATIENT)
Dept: SCHEDULING | Age: 65
End: 2020-12-16

## 2020-12-16 DIAGNOSIS — I50.9 HEART FAILURE, UNSPECIFIED (HCC): Primary | ICD-10-CM

## 2020-12-18 ENCOUNTER — HOSPITAL ENCOUNTER (OUTPATIENT)
Dept: NON INVASIVE DIAGNOSTICS | Age: 65
Discharge: HOME | End: 2020-12-18
Attending: INTERNAL MEDICINE
Payer: MEDICARE

## 2020-12-18 VITALS
BODY MASS INDEX: 32.78 KG/M2 | SYSTOLIC BLOOD PRESSURE: 133 MMHG | DIASTOLIC BLOOD PRESSURE: 94 MMHG | WEIGHT: 192 LBS | HEIGHT: 64 IN

## 2020-12-18 DIAGNOSIS — I50.9 HEART FAILURE, UNSPECIFIED (HCC): ICD-10-CM

## 2020-12-18 LAB
ECHO AO ARCH DIAM: 2.85 CM
ECHO AO ASC DIAM: 3.53 CM
ECHO AO ROOT DIAM: 3.3 CM
ECHO IVC PROX: 1.63 CM
ECHO IVC SNIFF: 1.63 CM
ECHO LA AREA 2C: 19.23 CM2
ECHO LA AREA 4C: 19.5 CM2
ECHO LA MAJOR AXIS: 3.44 CM
ECHO LA MINOR AXIS: 1.79 CM
ECHO LA TO AORTIC ROOT RATIO: 1.04
ECHO LA VOL 2C: 59.21 ML (ref 22–52)
ECHO LA VOL 4C: 61.64 ML (ref 22–52)
ECHO LA VOL BP: 66.42 ML (ref 22–52)
ECHO LA VOL/BSA BIPLANE: 34.55 ML/M2 (ref 16–28)
ECHO LA VOLUME INDEX A2C: 30.8 ML/M2 (ref 16–28)
ECHO LA VOLUME INDEX A4C: 32.06 ML/M2 (ref 16–28)
ECHO LV E' LATERAL VELOCITY: 8.51 CM/S
ECHO LV E' SEPTAL VELOCITY: 8.45 CM/S
ECHO LV EDV A2C: 100 ML
ECHO LV EDV A4C: 106.3 ML
ECHO LV EDV BP: 105.6 ML (ref 56–104)
ECHO LV EDV INDEX A4C: 55.3 ML/M2
ECHO LV EDV INDEX BP: 54.9 ML/M2
ECHO LV EDV NDEX A2C: 52 ML/M2
ECHO LV EDV TEICHHOLZ: 0.47 ML
ECHO LV EJECTION FRACTION A2C: 53 %
ECHO LV EJECTION FRACTION A4C: 57 %
ECHO LV EJECTION FRACTION BIPLANE: 55.5 % (ref 55–100)
ECHO LV ESV A2C: 46.8 ML
ECHO LV ESV A4C: 45.7 ML
ECHO LV ESV BP: 47 ML (ref 19–49)
ECHO LV ESV INDEX A2C: 24.3 ML/M2
ECHO LV ESV INDEX A4C: 23.8 ML/M2
ECHO LV ESV INDEX BP: 24.4 ML/M2
ECHO LV ESV TEICHHOLZ: 0.32 ML
ECHO LV INTERNAL DIMENSION DIASTOLIC: 4.17 CM (ref 3.9–5.3)
ECHO LV INTERNAL DIMENSION SYSTOLIC: 3.54 CM
ECHO LV IVSD: 1.3 CM (ref 0.6–0.9)
ECHO LV MASS 2D: 151.3 G (ref 67–162)
ECHO LV MASS INDEX 2D: 78.7 G/M2 (ref 43–95)
ECHO LV POSTERIOR WALL DIASTOLIC: 0.86 CM (ref 0.6–0.9)
ECHO LVOT DIAM: 1.91 CM
ECHO LVOT PEAK GRADIENT: 3.36 MMHG
ECHO LVOT SV: 57.1 ML
ECHO LVOT SV: 57.1 ML
ECHO LVOT VTI: 19.93 CM
ECHO MV A VELOCITY: 92.92 CM/S
ECHO MV E DECELERATION TIME (DT): 148 MS
ECHO MV E VELOCITY: 73.22 CM/S
ECHO MV E/A RATIO: 0.79
ECHO MV E/E' LATERAL: 8.6
ECHO MV E/E' RATIO (AVERAGED): 8.63
ECHO MV E/E' SEPTAL: 8.67
ECHO PV REGURGITANT MAX VELOCITY: 143.97 CM/S
ECHO RA MINOR AXIS: 2.84 CM
ECHO RV TAPSE: 1.65 CM (ref 1.5–2)
ECHO TV REGURGITANT MAX VELOCITY: 143.97 CM/S
ECHO TV REGURGITANT PEAK GRADIENT: 8.3 MMHG
LVFS 2D: 15.12 %
LVOT MG: 1.9 MMHG
LVOT MV: 0.66 CM/S
LVSV (MOD BI): 29.56 ML
LVSV (MOD SINGLE 4C): 30.59 ML
LVSV (MOD SINGLE): 26.81 ML
LVSV (TEICH): 12.57 ML
MV DEC SLOPE: 4.95

## 2020-12-18 PROCEDURE — 93306 TTE W/DOPPLER COMPLETE: CPT

## 2021-01-06 ENCOUNTER — HOSPITAL ENCOUNTER (OUTPATIENT)
Dept: MAMMOGRAPHY | Age: 66
Discharge: HOME | End: 2021-01-06
Attending: INTERNAL MEDICINE
Payer: MEDICARE

## 2021-01-06 DIAGNOSIS — Z12.31 SCREENING MAMMOGRAM, ENCOUNTER FOR: ICD-10-CM

## 2021-01-06 PROCEDURE — 77063 BREAST TOMOSYNTHESIS BI: CPT

## 2022-03-18 PROBLEM — Z86.39 HISTORY OF THYROID NODULE: Status: ACTIVE | Noted: 2018-07-06

## 2022-03-19 PROBLEM — R73.03 PRE-DIABETES: Status: ACTIVE | Noted: 2017-06-08

## 2022-03-19 PROBLEM — Z00.00 ANNUAL PHYSICAL EXAM: Status: ACTIVE | Noted: 2018-07-06

## 2022-03-19 PROBLEM — F17.210 CIGARETTE NICOTINE DEPENDENCE WITHOUT COMPLICATION: Status: ACTIVE | Noted: 2018-07-06

## 2022-03-19 PROBLEM — H53.9 VISION CHANGES: Status: ACTIVE | Noted: 2018-07-06

## 2022-03-19 PROBLEM — N28.9 KIDNEY LESION: Status: ACTIVE | Noted: 2017-06-08

## 2022-03-20 PROBLEM — E07.9 THYROID LESION: Status: ACTIVE | Noted: 2017-06-08

## 2023-01-27 ENCOUNTER — HOSPITAL ENCOUNTER (OUTPATIENT)
Dept: LAB | Age: 68
Discharge: HOME | End: 2023-01-27
Payer: MEDICARE

## 2023-01-27 PROCEDURE — 88305 TISSUE EXAM BY PATHOLOGIST: CPT

## 2023-03-22 ENCOUNTER — TRANSCRIBE ORDERS (OUTPATIENT)
Facility: HOSPITAL | Age: 68
End: 2023-03-22

## 2023-03-22 DIAGNOSIS — Z12.31 VISIT FOR SCREENING MAMMOGRAM: Primary | ICD-10-CM

## 2023-03-29 ENCOUNTER — HOSPITAL ENCOUNTER (OUTPATIENT)
Dept: WOMENS IMAGING | Facility: HOSPITAL | Age: 68
Discharge: HOME | End: 2023-04-01
Payer: MEDICARE

## 2023-03-29 DIAGNOSIS — Z12.31 VISIT FOR SCREENING MAMMOGRAM: ICD-10-CM

## 2023-03-29 PROCEDURE — 77063 BREAST TOMOSYNTHESIS BI: CPT
